# Patient Record
Sex: FEMALE | Race: WHITE | Employment: FULL TIME | URBAN - METROPOLITAN AREA
[De-identification: names, ages, dates, MRNs, and addresses within clinical notes are randomized per-mention and may not be internally consistent; named-entity substitution may affect disease eponyms.]

---

## 2017-02-10 ENCOUNTER — PATIENT MESSAGE (OUTPATIENT)
Dept: INTERNAL MEDICINE CLINIC | Age: 57
End: 2017-02-10

## 2017-02-10 DIAGNOSIS — E03.9 HYPOTHYROIDISM, ACQUIRED: Primary | ICD-10-CM

## 2017-03-01 ENCOUNTER — PATIENT MESSAGE (OUTPATIENT)
Dept: INTERNAL MEDICINE CLINIC | Age: 57
End: 2017-03-01

## 2017-03-01 DIAGNOSIS — E03.9 ACQUIRED HYPOTHYROIDISM: Primary | ICD-10-CM

## 2017-03-01 DIAGNOSIS — E03.9 HYPOTHYROIDISM, ACQUIRED: ICD-10-CM

## 2017-03-01 LAB — TSH SERPL DL<=0.05 MIU/L-ACNC: 4.91 UIU/ML (ref 0.27–4.2)

## 2017-03-16 ENCOUNTER — TELEPHONE (OUTPATIENT)
Dept: INTERNAL MEDICINE CLINIC | Age: 57
End: 2017-03-16

## 2017-03-20 PROBLEM — E03.9 ACQUIRED HYPOTHYROIDISM: Status: ACTIVE | Noted: 2017-03-20

## 2017-03-20 RX ORDER — LEVOTHYROXINE SODIUM 0.05 MG/1
50 TABLET ORAL DAILY
Qty: 30 TABLET | Refills: 3 | Status: SHIPPED | OUTPATIENT
Start: 2017-03-20 | End: 2017-07-25 | Stop reason: SDUPTHER

## 2017-06-06 LAB — TSH SERPL DL<=0.05 MIU/L-ACNC: 2.75 UIU/ML (ref 0.27–4.2)

## 2017-06-20 ENCOUNTER — OFFICE VISIT (OUTPATIENT)
Dept: INTERNAL MEDICINE CLINIC | Age: 57
End: 2017-06-20

## 2017-06-20 VITALS
HEIGHT: 66 IN | RESPIRATION RATE: 12 BRPM | WEIGHT: 202 LBS | BODY MASS INDEX: 32.47 KG/M2 | SYSTOLIC BLOOD PRESSURE: 110 MMHG | DIASTOLIC BLOOD PRESSURE: 82 MMHG

## 2017-06-20 DIAGNOSIS — R23.2 HOT FLASHES: ICD-10-CM

## 2017-06-20 DIAGNOSIS — E03.9 ACQUIRED HYPOTHYROIDISM: Primary | ICD-10-CM

## 2017-06-20 DIAGNOSIS — E66.9 OBESITY (BMI 30-39.9): ICD-10-CM

## 2017-06-20 PROCEDURE — 99213 OFFICE O/P EST LOW 20 MIN: CPT | Performed by: INTERNAL MEDICINE

## 2017-07-25 ENCOUNTER — TELEPHONE (OUTPATIENT)
Dept: INTERNAL MEDICINE CLINIC | Age: 57
End: 2017-07-25

## 2017-07-25 RX ORDER — LEVOTHYROXINE SODIUM 0.05 MG/1
50 TABLET ORAL DAILY
Qty: 90 TABLET | Refills: 3 | Status: SHIPPED | OUTPATIENT
Start: 2017-07-25 | End: 2017-11-02 | Stop reason: SDUPTHER

## 2017-11-01 ENCOUNTER — OFFICE VISIT (OUTPATIENT)
Dept: INTERNAL MEDICINE CLINIC | Age: 57
End: 2017-11-01

## 2017-11-01 VITALS
RESPIRATION RATE: 12 BRPM | WEIGHT: 202 LBS | HEIGHT: 66 IN | BODY MASS INDEX: 32.47 KG/M2 | HEART RATE: 62 BPM | DIASTOLIC BLOOD PRESSURE: 78 MMHG | SYSTOLIC BLOOD PRESSURE: 136 MMHG

## 2017-11-01 DIAGNOSIS — E03.9 ACQUIRED HYPOTHYROIDISM: ICD-10-CM

## 2017-11-01 DIAGNOSIS — Z00.00 ANNUAL PHYSICAL EXAM: Primary | ICD-10-CM

## 2017-11-01 DIAGNOSIS — Z87.42 HISTORY OF ABNORMAL CERVICAL PAP SMEAR: ICD-10-CM

## 2017-11-01 LAB
A/G RATIO: 1.8 (ref 1.1–2.2)
ALBUMIN SERPL-MCNC: 4.4 G/DL (ref 3.4–5)
ALP BLD-CCNC: 107 U/L (ref 40–129)
ALT SERPL-CCNC: 24 U/L (ref 10–40)
ANION GAP SERPL CALCULATED.3IONS-SCNC: 13 MMOL/L (ref 3–16)
AST SERPL-CCNC: 20 U/L (ref 15–37)
BASOPHILS ABSOLUTE: 0 K/UL (ref 0–0.2)
BASOPHILS RELATIVE PERCENT: 0.8 %
BILIRUB SERPL-MCNC: 0.4 MG/DL (ref 0–1)
BILIRUBIN, POC: NORMAL
BLOOD URINE, POC: NORMAL
BUN BLDV-MCNC: 13 MG/DL (ref 7–20)
CALCIUM SERPL-MCNC: 9.5 MG/DL (ref 8.3–10.6)
CHLORIDE BLD-SCNC: 101 MMOL/L (ref 99–110)
CHOLESTEROL, TOTAL: 236 MG/DL (ref 0–199)
CLARITY, POC: CLEAR
CO2: 28 MMOL/L (ref 21–32)
COLOR, POC: NORMAL
CREAT SERPL-MCNC: 0.6 MG/DL (ref 0.6–1.1)
EOSINOPHILS ABSOLUTE: 0.1 K/UL (ref 0–0.6)
EOSINOPHILS RELATIVE PERCENT: 1.5 %
GFR AFRICAN AMERICAN: >60
GFR NON-AFRICAN AMERICAN: >60
GLOBULIN: 2.5 G/DL
GLUCOSE BLD-MCNC: 86 MG/DL (ref 70–99)
GLUCOSE URINE, POC: NORMAL
HCT VFR BLD CALC: 44.5 % (ref 36–48)
HDLC SERPL-MCNC: 47 MG/DL (ref 40–60)
HEMOGLOBIN: 14.7 G/DL (ref 12–16)
KETONES, POC: NORMAL
LDL CHOLESTEROL CALCULATED: 157 MG/DL
LEUKOCYTE EST, POC: NORMAL
LYMPHOCYTES ABSOLUTE: 1.4 K/UL (ref 1–5.1)
LYMPHOCYTES RELATIVE PERCENT: 23.4 %
MCH RBC QN AUTO: 29.5 PG (ref 26–34)
MCHC RBC AUTO-ENTMCNC: 32.9 G/DL (ref 31–36)
MCV RBC AUTO: 89.6 FL (ref 80–100)
MONOCYTES ABSOLUTE: 0.4 K/UL (ref 0–1.3)
MONOCYTES RELATIVE PERCENT: 7.2 %
NEUTROPHILS ABSOLUTE: 4.1 K/UL (ref 1.7–7.7)
NEUTROPHILS RELATIVE PERCENT: 67.1 %
NITRITE, POC: NORMAL
PDW BLD-RTO: 13.4 % (ref 12.4–15.4)
PH, POC: 5
PLATELET # BLD: 250 K/UL (ref 135–450)
PMV BLD AUTO: 7.7 FL (ref 5–10.5)
POTASSIUM SERPL-SCNC: 4.9 MMOL/L (ref 3.5–5.1)
PROTEIN, POC: NORMAL
RBC # BLD: 4.97 M/UL (ref 4–5.2)
SODIUM BLD-SCNC: 142 MMOL/L (ref 136–145)
SPECIFIC GRAVITY, POC: 1.01
TOTAL PROTEIN: 6.9 G/DL (ref 6.4–8.2)
TRIGL SERPL-MCNC: 158 MG/DL (ref 0–150)
TSH SERPL DL<=0.05 MIU/L-ACNC: 2.47 UIU/ML (ref 0.27–4.2)
UROBILINOGEN, POC: 0.2
VITAMIN D 25-HYDROXY: 36.6 NG/ML
VLDLC SERPL CALC-MCNC: 32 MG/DL
WBC # BLD: 6.2 K/UL (ref 4–11)

## 2017-11-01 PROCEDURE — 81002 URINALYSIS NONAUTO W/O SCOPE: CPT | Performed by: INTERNAL MEDICINE

## 2017-11-01 PROCEDURE — 99396 PREV VISIT EST AGE 40-64: CPT | Performed by: INTERNAL MEDICINE

## 2017-11-01 PROCEDURE — 93000 ELECTROCARDIOGRAM COMPLETE: CPT | Performed by: INTERNAL MEDICINE

## 2017-11-01 ASSESSMENT — PATIENT HEALTH QUESTIONNAIRE - PHQ9
1. LITTLE INTEREST OR PLEASURE IN DOING THINGS: 0
SUM OF ALL RESPONSES TO PHQ QUESTIONS 1-9: 0
SUM OF ALL RESPONSES TO PHQ9 QUESTIONS 1 & 2: 0
2. FEELING DOWN, DEPRESSED OR HOPELESS: 0

## 2017-11-01 NOTE — PATIENT INSTRUCTIONS
Preventive plan of care for Mandi Becker        11/1/2017           Preventive Measures Status       Recommendations for screening   Colon Cancer Screen   Last colonoscopy: 9/13/16 Test recommended every 10 years. Next colonoscopy due in 2026   Breast Cancer Screen  Last mammogram: 2/16/2016  Test should be obtained yearly. Due   Cervical Cancer Screen   Last PAP smear: 12/8/2015, Dr. Derick Lyons. Test is needed yearly unless otherwise advised by your gynecologist.  Anupama Gabailey   Osteoporosis Screen   Last DXA scan: 6/6/2014, Normal Repeat test at age 73   Diabetes Screen  Glucose (mg/dL)   Date Value   09/01/2016 99    Test recommended and ordered   Cholesterol Screen  Lab Results   Component Value Date    CHOL 211 (H) 09/01/2016    Test recommended and ordered   HIV screening recommended for those ages 12-76 NO MATTER THE RISK FOR HIV--  9/1/2016 No need to repeat at this time   Hepatitis C screening recommended for those born between [facility]-- 9/1/2016 No need to repeat   Aspirin for Cardiovascular Prevention   none Baby aspirin (81 mg) recommended for women beginning at the age of 54. Weight: Body mass index is 32.6 kg/m². 5' 6\" (1.676 m)202 lb (91.6 kg)    Your BMI is above 24.9.  > or equal to 30 is the obesity category. Continue aggressive diet and exercise changes. Recommended Immunizations    Immunization History   Administered Date(s) Administered    Influenza Virus Vaccine 10/10/2017    Tdap (Boostrix, Adacel) 05/21/2014        Hep A-- Date of vaccination unknown. Influenza vaccine:  recommended every fall-- previously received    Pneumonia vaccine: Prevnar due at age 72    Tetanus vaccine:  tetanus and diptheria vaccine (Td/Tdap) recommended every 10 years- Td due in 2024. Shingles vaccine:  recommended as a one time dose after the age of 61         Additional Recommendations   1. Sunscreen use discussed and recommended  2.  Continue a healthy lifestyle including a healthy diet and aerobic exercise  3. Update your eye exam every 2 years  4. Always wear a seatbelt  5. Always wear a helmet when riding a bike or motorcycle  6. See your dermatologist yearly for comprehensive of skin exam-- Dr. Jermaine Cooper.  7. See Dr. Shantal Hutson        Here are a few  Reliable websites with a variety of health and wellness information:   www.mylifecheck. heart. org     www.nutritionsource. org     www. americanheart. org     www. diabetes. org      www.menopause. org     www.[facility]     www.[employer] ([facility] site)          Patient Education        Hypothyroidism: Care Instructions  Your Care Instructions  You have hypothyroidism, which means that your body is not making enough thyroid hormone. This hormone helps your body use energy. If your thyroid level is low, you may feel tired, be constipated, have an increase in your blood pressure, or have dry skin or memory problems. You may also get cold easily, even when it is warm. Women with low thyroid levels may have heavy menstrual periods. A blood test to find your thyroid-stimulating hormone (TSH) level is used to check for hypothyroidism. A high TSH level may mean that you have low thyroid. When your body is not making enough thyroid hormone, TSH levels rise in an effort to make the body produce more. The treatment for hypothyroidism is to take thyroid hormone pills. You should start to feel better in 1 to 2 weeks. But it can take several months to see changes in the TSH level. You will need regular visits with your doctor to make sure you have the right dose of medicine. Most people need treatment for the rest of their lives. You will need to see your doctor regularly to have blood tests and to make sure you are doing well. Follow-up care is a key part of your treatment and safety. Be sure to make and go to all appointments, and call your doctor if you are having problems.  Its also a good idea to know your test results and keep a list of the medicines you take. How can you care for yourself at home? · Take your thyroid hormone medicine exactly as prescribed. Call your doctor if you think you are having a problem with your medicine. Most people do not have side effects if they take the right amount of medicine regularly. ¨ Take the medicine 30 minutes before breakfast, and do not take it with calcium, vitamins, or iron. ¨ Do not take extra doses of your thyroid medicine. It will not help you get better any faster, and it may cause side effects. ¨ If you forget to take a dose, do NOT take a double dose of medicine. Take your usual dose the next day. · Tell your doctor about all prescription, herbal, or over-the-counter products you take. · Take care of yourself. Eat a healthy diet, get enough sleep, and get regular exercise. When should you call for help? Call 911 anytime you think you may need emergency care. For example, call if:  · You passed out (lost consciousness). · You have severe trouble breathing. · You have a very slow heartbeat (less than 60 beats a minute). · You have a low body temperature (95°F or below). Call your doctor now or seek immediate medical care if:  · You feel tired, sluggish, or weak. · You have trouble remembering things or concentrating. · You do not begin to feel better 2 weeks after starting your medicine. Watch closely for changes in your health, and be sure to contact your doctor if you have any problems. Where can you learn more? Go to https://[employer]. org and sign in to your [employer] account. Enter T476 in the [facility] box to learn more about \"Hypothyroidism: Care Instructions. \"     If you do not have an account, please click on the \"Sign Up Now\" link. Current as of: July 28, 2016  Content Version: 11.3  © 8773-3572 [employer]. Care instructions adapted under license by [facility] ([facility]).  If you have questions about a medical condition or this instruction, always ask your healthcare professional. [place] [address] any warranty or liability for your use of this information. Patient Education        Well Visit, Women 48 to 72: Care Instructions  Your Care Instructions  Physical exams can help you stay healthy. Your doctor has checked your overall health and may have suggested ways to take good care of yourself. He or she also may have recommended tests. At home, you can help prevent illness with healthy eating, regular exercise, and other steps. Follow-up care is a key part of your treatment and safety. Be sure to make and go to all appointments, and call your doctor if you are having problems. It's also a good idea to know your test results and keep a list of the medicines you take. How can you care for yourself at home? · Reach and stay at a healthy weight. This will lower your risk for many problems, such as obesity, diabetes, heart disease, and high blood pressure. · Get at least 30 minutes of exercise on most days of the week. Walking is a good choice. You also may want to do other activities, such as running, swimming, cycling, or playing tennis or team sports. · Do not smoke. Smoking can make health problems worse. If you need help quitting, talk to your doctor about stop-smoking programs and medicines. These can increase your chances of quitting for good. · Protect your skin from too much sun. When you're outdoors from 10 a.m. to 4 p.m., stay in the shade or cover up with clothing and a hat with a wide brim. Wear sunglasses that block UV rays. Even when it's cloudy, put broad-spectrum sunscreen (SPF 30 or higher) on any exposed skin. · See a dentist one or two times a year for checkups and to have your teeth cleaned. · Wear a seat belt in the car. · Limit alcohol to 1 drink a day. Too much alcohol can cause health problems. Follow your doctor's advice about when to have certain tests.  These tests can spot problems early.  · Cholesterol. Your doctor will tell you how often to have this done based on your age, family history, or other things that can increase your risk for heart attack and stroke. · Blood pressure. Have your blood pressure checked during a routine doctor visit. Your doctor will tell you how often to check your blood pressure based on your age, your blood pressure results, and other factors. · Mammogram. Ask your doctor how often you should have a mammogram, which is an X-ray of your breasts. A mammogram can spot breast cancer before it can be felt and when it is easiest to treat. · Pap test and pelvic exam. Ask your doctor how often you should have a Pap test. You may not need to have a Pap test as often as you used to. · Vision. Have your eyes checked every year or two or as often as your doctor suggests. Some experts recommend that you have yearly exams for glaucoma and other age-related eye problems starting at age 48. · Hearing. Tell your doctor if you notice any change in your hearing. You can have tests to find out how well you hear. · Diabetes. Ask your doctor whether you should have tests for diabetes. · Colon cancer. You should begin tests for colon cancer at age 48. You may have one of several tests. Your doctor will tell you how often to have tests based on your age and risk. Risks include whether you already had a precancerous polyp removed from your colon or whether your parents, sisters and brothers, or children have had colon cancer. · Thyroid disease. Talk to your doctor about whether to have your thyroid checked as part of a regular physical exam. Women have an increased chance of a thyroid problem. · Osteoporosis. You should begin tests for bone density at age 72. If you are younger than 72, ask your doctor whether you have factors that may increase your risk for this disease. You may want to have this test before age 72. · Heart attack and stroke risk.  At least every 4 to 6 years,

## 2017-11-01 NOTE — PROGRESS NOTES
[facility]) Physicians  Internal Medicine  Patient Encounter  Lea Garcia D.O., [facility] Preventative Physical    Chief Complaint   Patient presents with    Annual Exam       HPI-- 65 y.o. female presents today for a complete annual physical.      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          Allergies   Allergen Reactions    Prednisone      Severe steroid psychosis    Lorazepam      Paradoxical reaction         Substance Use History     Social History   Substance Use Topics    Smoking status: Former Smoker     Packs/day: 1.00     Years: 10.00     Quit date: 8/31/1996    Smokeless tobacco: Never Used    Alcohol use No          Family History     Family History   Problem Relation Age of Onset    Cancer Mother 59     Leukemia    Cancer Father 48     Melenoma    Rheum Arthritis Neg Hx     Osteoarthritis Neg Hx     Breast Cancer Neg Hx     Heart Failure Neg Hx     Hypertension Neg Hx     Migraines Neg Hx     Ovarian Cancer Neg Hx     Rashes/Skin Problems Neg Hx     Seizures Neg Hx     Thyroid Disease Neg Hx               REVIEW OF SYSTEMS:    CONSTITUTIONAL:  Neg   Recent weight changes, fatigue, fever, chills or night sweats, anorexia, Sleep difficulties  EYES: Neg  Blurry

## 2017-11-03 RX ORDER — LEVOTHYROXINE SODIUM 0.05 MG/1
50 TABLET ORAL DAILY
Qty: 90 TABLET | Refills: 3 | Status: SHIPPED | OUTPATIENT
Start: 2017-11-03 | End: 2018-12-26 | Stop reason: SDUPTHER

## 2017-11-14 ENCOUNTER — OFFICE VISIT (OUTPATIENT)
Dept: GYNECOLOGY | Age: 57
End: 2017-11-14

## 2017-11-14 VITALS
SYSTOLIC BLOOD PRESSURE: 137 MMHG | TEMPERATURE: 96.7 F | HEART RATE: 73 BPM | DIASTOLIC BLOOD PRESSURE: 85 MMHG | WEIGHT: 204 LBS | BODY MASS INDEX: 33.99 KG/M2 | RESPIRATION RATE: 17 BRPM | HEIGHT: 65 IN

## 2017-11-14 DIAGNOSIS — Z78.0 MENOPAUSE: ICD-10-CM

## 2017-11-14 DIAGNOSIS — Z01.419 WELL WOMAN EXAM WITH ROUTINE GYNECOLOGICAL EXAM: Primary | ICD-10-CM

## 2017-11-14 PROCEDURE — 99396 PREV VISIT EST AGE 40-64: CPT | Performed by: OBSTETRICS & GYNECOLOGY

## 2017-11-16 LAB
HPV COMMENT: NORMAL
HPV TYPE 16: NOT DETECTED
HPV TYPE 18: NOT DETECTED
HPVOH (OTHER TYPES): NOT DETECTED

## 2017-11-19 ASSESSMENT — ENCOUNTER SYMPTOMS
RESPIRATORY NEGATIVE: 1
EYES NEGATIVE: 1
BACK PAIN: 1
GASTROINTESTINAL NEGATIVE: 1

## 2017-11-19 NOTE — PROGRESS NOTES
Outpatient Prescriptions Marked as Taking for the 11/14/17 encounter (Office Visit) with Millie Brar MD   Medication Sig Dispense Refill    levothyroxine (LEVOTHROID) 50 MCG tablet Take 1 tablet by mouth daily 90 tablet 3     Family History   Problem Relation Age of Onset    Cancer Mother 59     Leukemia    Cancer Father 48     Melenoma    Rheum Arthritis Neg Hx     Osteoarthritis Neg Hx     Breast Cancer Neg Hx     Heart Failure Neg Hx     Hypertension Neg Hx     Migraines Neg Hx     Ovarian Cancer Neg Hx     Rashes/Skin Problems Neg Hx     Seizures Neg Hx     Thyroid Disease Neg Hx      /85 (Site: Left Arm, Position: Sitting, Cuff Size: Large Adult)   Pulse 73   Temp 96.7 °F (35.9 °C) (Oral)   Resp 17   Ht 5' 5\" (1.651 m)   Wt 204 lb (92.5 kg)   LMP 08/01/2016   Breastfeeding? No   BMI 33.95 kg/m²       Objective:   Physical Exam   Constitutional: She is oriented to person, place, and time. She appears well-developed and well-nourished. No distress. HENT:   Head: Normocephalic and atraumatic. Eyes: EOM are normal. Pupils are equal, round, and reactive to light. Neck: Normal range of motion. Neck supple. No thyromegaly present. Cardiovascular: Normal rate, regular rhythm and normal heart sounds. Exam reveals no gallop and no friction rub. No murmur heard. Pulmonary/Chest: Effort normal and breath sounds normal. No respiratory distress. She has no wheezes. She has no rales. Abdominal: Soft. Bowel sounds are normal. She exhibits no distension and no mass. There is no hepatomegaly. There is no tenderness. There is no rebound and no guarding. No hernia. Genitourinary: Rectum normal, vagina normal and uterus normal. Rectal exam shows no external hemorrhoid, no internal hemorrhoid, no fissure, no mass, no tenderness and guaiac negative stool. No breast swelling, tenderness, discharge or bleeding. There is no rash, tenderness, lesion or injury on the right labia.  There

## 2018-02-23 ENCOUNTER — OFFICE VISIT (OUTPATIENT)
Dept: INTERNAL MEDICINE CLINIC | Age: 58
End: 2018-02-23

## 2018-02-23 ENCOUNTER — HOSPITAL ENCOUNTER (OUTPATIENT)
Dept: OTHER | Age: 58
Discharge: HOME | End: 2018-02-23
Attending: INTERNAL MEDICINE | Admitting: INTERNAL MEDICINE

## 2018-02-23 VITALS — WEIGHT: 210 LBS | SYSTOLIC BLOOD PRESSURE: 130 MMHG | BODY MASS INDEX: 34.95 KG/M2 | DIASTOLIC BLOOD PRESSURE: 80 MMHG

## 2018-02-23 DIAGNOSIS — G89.29 CHRONIC BILATERAL LOW BACK PAIN WITH RIGHT-SIDED SCIATICA: Primary | ICD-10-CM

## 2018-02-23 DIAGNOSIS — M54.41 CHRONIC BILATERAL LOW BACK PAIN WITH RIGHT-SIDED SCIATICA: Primary | ICD-10-CM

## 2018-02-23 DIAGNOSIS — G89.29 CHRONIC BILATERAL LOW BACK PAIN WITH RIGHT-SIDED SCIATICA: ICD-10-CM

## 2018-02-23 DIAGNOSIS — M54.41 CHRONIC BILATERAL LOW BACK PAIN WITH RIGHT-SIDED SCIATICA: ICD-10-CM

## 2018-02-23 DIAGNOSIS — M47.26 OSTEOARTHRITIS OF SPINE WITH RADICULOPATHY, LUMBAR REGION: ICD-10-CM

## 2018-02-23 DIAGNOSIS — H93.8X1 EAR FULLNESS, RIGHT: ICD-10-CM

## 2018-02-23 PROCEDURE — 1036F TOBACCO NON-USER: CPT | Performed by: INTERNAL MEDICINE

## 2018-02-23 PROCEDURE — G8482 FLU IMMUNIZE ORDER/ADMIN: HCPCS | Performed by: INTERNAL MEDICINE

## 2018-02-23 PROCEDURE — G8428 CUR MEDS NOT DOCUMENT: HCPCS | Performed by: INTERNAL MEDICINE

## 2018-02-23 PROCEDURE — 3017F COLORECTAL CA SCREEN DOC REV: CPT | Performed by: INTERNAL MEDICINE

## 2018-02-23 PROCEDURE — 99213 OFFICE O/P EST LOW 20 MIN: CPT | Performed by: INTERNAL MEDICINE

## 2018-02-23 PROCEDURE — G8417 CALC BMI ABV UP PARAM F/U: HCPCS | Performed by: INTERNAL MEDICINE

## 2018-02-23 PROCEDURE — 3014F SCREEN MAMMO DOC REV: CPT | Performed by: INTERNAL MEDICINE

## 2018-02-23 RX ORDER — IBUPROFEN 800 MG/1
800 TABLET ORAL
Qty: 30 TABLET | Refills: 1 | Status: SHIPPED | OUTPATIENT
Start: 2018-02-23 | End: 2018-03-23 | Stop reason: DRUGHIGH

## 2018-02-23 RX ORDER — TIZANIDINE 4 MG/1
4 TABLET ORAL NIGHTLY
Qty: 30 TABLET | Refills: 0 | Status: SHIPPED | OUTPATIENT
Start: 2018-02-23 | End: 2018-11-30 | Stop reason: ALTCHOICE

## 2018-02-23 NOTE — PATIENT INSTRUCTIONS
which helps tilt your hips forward. This takes pressure off your lower back. · Try sitting on an exercise ball. It can rock from side to side, which helps keep your back loose. · When driving, keep your knees nearly level with your hips. Sit straight, and drive with both hands on the steering wheel. Your arms should be in a slightly bent position. Reduce stress on your back through careful lifting  · Squat down, bending at the hips and knees only. If you need to, put one knee to the floor and extend your other knee in front of you, bent at a right angle (half kneeling). · Press your chest straight forward. This helps keep your upper back straight while keeping a slight arch in your low back. · Hold the load as close to your body as possible, at the level of your belly button (navel). · Use your feet to change direction, taking small steps. · Lead with your hips as you change direction. Keep your shoulders in line with your hips as you move. · Set down your load carefully, squatting with your knees and hips only. Exercise and stretch your back  · Do some exercise on most days of the week, if your doctor says it is okay. You can walk, run, swim, or cycle. · Stretch your back muscles. Here are a few exercises to try:  Jose Luis Pata on your back, and gently pull one bent knee to your chest. Put that foot back on the floor, and then pull the other knee to your chest.  ¨ Do pelvic tilts. Lie on your back with your knees bent. Tighten your stomach muscles. Pull your belly button (navel) in and up toward your ribs. You should feel like your back is pressing to the floor and your hips and pelvis are slightly lifting off the floor. Hold for 6 seconds while breathing smoothly. ¨ Sit with your back flat against a wall. · Keep your core muscles strong. The muscles of your back, belly (abdomen), and buttocks support your spine. ¨ Pull in your belly and imagine pulling your navel toward your spine.  Hold this for 6 seconds, floor. Gently pull one bent knee to your chest. Put that foot back on the floor, and then pull the other knee to your chest. Hold for 15 to 30 seconds. Repeat 2 to 4 times. ¨ Do pelvic tilts. Lie on your back with your knees bent. Tighten your stomach muscles. Pull your belly button (navel) in and up toward your ribs. You should feel like your back is pressing to the floor and your hips and pelvis are slightly lifting off the floor. Hold for 6 seconds while breathing smoothly. · Keep your core muscles strong. The muscles of your back, belly (abdomen), and buttocks support your spine. ¨ Pull in your belly, and imagine pulling your navel toward your spine. Hold this for 6 seconds, then relax. Remember to keep breathing normally as you tense your muscles. ¨ Do curl-ups. Always do them with your knees bent. Keep your low back on the floor, and curl your shoulders toward your knees using a smooth, slow motion. Keep your arms folded across your chest. If this bothers your neck, try putting your hands behind your neck (not your head), with your elbows spread apart. ¨ Lie on your back with your knees bent and your feet flat on the floor. Tighten your belly muscles, and then push with your feet and raise your buttocks up a few inches. Hold this position 6 seconds as you continue to breathe normally, then lower yourself slowly to the floor. Repeat 8 to 12 times. ¨ If you like group exercise, try Pilates or yoga. These classes have poses that strengthen the core muscles. Protect your back when you sit  · Place a small pillow, a rolled-up towel, or a lumbar roll in the curve of your back if you need extra support. · Sit in a chair that is low enough to let you place both feet flat on the floor with both knees nearly level with your hips. If your chair or desk is too high, use a foot rest to raise your knees. · When driving, keep your knees nearly level with your hips.  Sit straight, and drive with both hands on the link.  Current as of: March 21, 2017  Content Version: 11.5  © 8892-7841 [employer]. Care instructions adapted under license by [facility] ([facility]). If you have questions about a medical condition or this instruction, always ask your healthcare professional. [address] any warranty or liability for your use of this information. Patient Education        Low Back Pain: Exercises  Your Care Instructions  Here are some examples of typical rehabilitation exercises for your condition. Start each exercise slowly. Ease off the exercise if you start to have pain. Your doctor or physical therapist will tell you when you can start these exercises and which ones will work best for you. How to do the exercises  Press-up    5. Lie on your stomach, supporting your body with your forearms. 6. Press your elbows down into the floor to raise your upper back. As you do this, relax your stomach muscles and allow your back to arch without using your back muscles. As your press up, do not let your hips or pelvis come off the floor. 7. Hold for 15 to 30 seconds, then relax. 8. Repeat 2 to 4 times. Alternate arm and leg (bird dog) exercise    Do this exercise slowly. Try to keep your body straight at all times, and do not let one hip drop lower than the other. 5. Start on the floor, on your hands and knees. 6. Tighten your belly muscles. 7. Raise one leg off the floor, and hold it straight out behind you. Be careful not to let your hip drop down, because that will twist your trunk. 8. Hold for about 6 seconds, then lower your leg and switch to the other leg. 9. Repeat 8 to 12 times on each leg. 10. Over time, work up to holding for 10 to 30 seconds each time. 11. If you feel stable and secure with your leg raised, try raising the opposite arm straight out in front of you at the same time. Knee-to-chest exercise    5.  Lie on your back with your knees bent and your feet flat on the

## 2018-03-08 ENCOUNTER — HOSPITAL ENCOUNTER (OUTPATIENT)
Dept: PHYSICAL THERAPY | Age: 58
Discharge: HOME | End: 2018-03-31
Attending: INTERNAL MEDICINE | Admitting: INTERNAL MEDICINE

## 2018-03-08 ASSESSMENT — PAIN SCALES - GENERAL: PAINLEVEL_OUTOF10: 8

## 2018-03-08 ASSESSMENT — PAIN DESCRIPTION - PAIN TYPE: TYPE: CHRONIC PAIN

## 2018-03-08 ASSESSMENT — PAIN DESCRIPTION - ORIENTATION: ORIENTATION: RIGHT

## 2018-03-08 ASSESSMENT — PAIN DESCRIPTION - LOCATION: LOCATION: BACK

## 2018-03-22 ENCOUNTER — TELEPHONE (OUTPATIENT)
Dept: GYNECOLOGY | Age: 58
End: 2018-03-22

## 2018-03-22 DIAGNOSIS — N95.0 POSTMENOPAUSAL BLEEDING: Primary | ICD-10-CM

## 2018-03-22 NOTE — TELEPHONE ENCOUNTER
Vaginal Bleeding:    How long has patient had symptoms:Started 3/21/18    Is the patient experiencing clotting? No    If yes please describe the size of the clots: How many pads per hour is the patient using at this point:0    Has the # of pads increased over the last few hours No:     Is the patient experiencing cramping? Yes      If yes to cramping please describe where pain is occuring:    Pain Rates as 2 / 10 on pain scale. Are you allergic to any medications? Yes    Pt states she has not had a cycle for 2 years. Yesterday she started with light bleeding when she wipes there is bright red blood. She denies any urinary frequency or urgency no burning or discoloration to urine. No fever No Urinary frequency,urgency or dysuria. No pelvic pain. No fever. Mild abd cramping. She was scheduled with Dr Dion Alvarado for follow up 4/12/18. Pt was given bleeding precautions. Pt is aware if bleeding heavy to the point of soaking a pad or tampon every hour or having large clots that she will need to report to the Emergency Department  . Please advise.

## 2018-03-22 NOTE — TELEPHONE ENCOUNTER
Called patient advised, she will call and get this done, already has appt set up with Dr. Boyd Stephen for 4/12 DONE

## 2018-03-23 ENCOUNTER — OFFICE VISIT (OUTPATIENT)
Dept: INTERNAL MEDICINE CLINIC | Age: 58
End: 2018-03-23

## 2018-03-23 ENCOUNTER — TELEPHONE (OUTPATIENT)
Dept: INTERNAL MEDICINE CLINIC | Age: 58
End: 2018-03-23

## 2018-03-23 VITALS — BODY MASS INDEX: 34.78 KG/M2 | DIASTOLIC BLOOD PRESSURE: 60 MMHG | WEIGHT: 209 LBS | SYSTOLIC BLOOD PRESSURE: 110 MMHG

## 2018-03-23 DIAGNOSIS — G89.29 CHRONIC BILATERAL LOW BACK PAIN WITH RIGHT-SIDED SCIATICA: ICD-10-CM

## 2018-03-23 DIAGNOSIS — M54.41 CHRONIC BILATERAL LOW BACK PAIN WITH RIGHT-SIDED SCIATICA: ICD-10-CM

## 2018-03-23 DIAGNOSIS — M99.03 LUMBAR REGION SOMATIC DYSFUNCTION: ICD-10-CM

## 2018-03-23 DIAGNOSIS — M51.36 DDD (DEGENERATIVE DISC DISEASE), LUMBAR: Primary | ICD-10-CM

## 2018-03-23 PROCEDURE — 3014F SCREEN MAMMO DOC REV: CPT | Performed by: INTERNAL MEDICINE

## 2018-03-23 PROCEDURE — 3017F COLORECTAL CA SCREEN DOC REV: CPT | Performed by: INTERNAL MEDICINE

## 2018-03-23 PROCEDURE — 99213 OFFICE O/P EST LOW 20 MIN: CPT | Performed by: INTERNAL MEDICINE

## 2018-03-23 PROCEDURE — G8482 FLU IMMUNIZE ORDER/ADMIN: HCPCS | Performed by: INTERNAL MEDICINE

## 2018-03-23 PROCEDURE — G8427 DOCREV CUR MEDS BY ELIG CLIN: HCPCS | Performed by: INTERNAL MEDICINE

## 2018-03-23 PROCEDURE — 1036F TOBACCO NON-USER: CPT | Performed by: INTERNAL MEDICINE

## 2018-03-23 PROCEDURE — G8417 CALC BMI ABV UP PARAM F/U: HCPCS | Performed by: INTERNAL MEDICINE

## 2018-03-23 RX ORDER — IBUPROFEN 800 MG/1
800 TABLET ORAL NIGHTLY PRN
Qty: 30 TABLET | Refills: 1 | Status: SHIPPED | OUTPATIENT
Start: 2018-03-23 | End: 2020-03-03 | Stop reason: ALTCHOICE

## 2018-03-23 NOTE — PROGRESS NOTES
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 Authorizing Provider   ibuprofen (ADVIL;MOTRIN) 800 MG tablet Take 1 tablet by mouth nightly as needed Yes Jonatan Garcia, DO   Tens Unit MISC by Does not apply route Apply daily as needed Yes Jonatan Garcia, DO   tiZANidine (ZANAFLEX) 4 MG tablet Take 1 tablet by mouth nightly Yes Jonatan Garcia, DO   levothyroxine (LEVOTHROID) 50 MCG tablet Take 1 tablet by mouth daily Yes [facility], DO           Review of Systems - As per HPI      OBJECTIVE:  /60   Wt 209 lb (94.8 kg)   LMP 08/01/2016   BMI 34.78 kg/m²   GEN: NAD, A&O, Non-toxic  MS:  Tenderness with palpation of the lumbar paraspinals. Increased soft tissue tone and spasm noted. NEURO: No focal or lateralizing deficits. VASC:  No carotid bruits. Pulses symmetric    ASSESSMENT[de-identified]  Benson Carina was seen today for follow-up and back pain. Diagnoses and all orders for this visit:    DDD (degenerative disc disease), lumbar  -     Tens Unit MISC; by Does not apply route Apply daily as needed    Chronic bilateral low back pain with right-sided sciatica  -     ibuprofen (ADVIL;MOTRIN) 800 MG tablet; Take 1 tablet by mouth nightly as needed  -     Tens Unit MISC; by Does not apply route Apply daily as needed    Lumbar region somatic dysfunction  -     Tens Unit MISC; by Does not apply route Apply daily as needed        Additional Plan:  1. HEP  2. May need MRI I symptoms worse. Discussed medications with patient who voiced understanding of their use, indication and potential side effects. Pt also understands the above recommendations. All questions answered.

## 2018-03-27 ENCOUNTER — HOSPITAL ENCOUNTER (OUTPATIENT)
Dept: ULTRASOUND IMAGING | Age: 58
Discharge: HOME | End: 2018-03-27
Attending: NURSE PRACTITIONER | Admitting: NURSE PRACTITIONER

## 2018-03-27 DIAGNOSIS — N95.0 POSTMENOPAUSAL BLEEDING: ICD-10-CM

## 2018-04-01 ENCOUNTER — HOSPITAL ENCOUNTER (OUTPATIENT)
Dept: OTHER | Age: 58
Discharge: HOME | End: 2018-04-30
Attending: INTERNAL MEDICINE | Admitting: INTERNAL MEDICINE

## 2018-04-12 ENCOUNTER — OFFICE VISIT (OUTPATIENT)
Dept: GYNECOLOGY | Age: 58
End: 2018-04-12

## 2018-04-12 VITALS
HEIGHT: 65 IN | DIASTOLIC BLOOD PRESSURE: 78 MMHG | HEART RATE: 62 BPM | WEIGHT: 210.5 LBS | RESPIRATION RATE: 17 BRPM | BODY MASS INDEX: 35.07 KG/M2 | TEMPERATURE: 98 F | SYSTOLIC BLOOD PRESSURE: 140 MMHG

## 2018-04-12 DIAGNOSIS — N95.0 POST-MENOPAUSAL BLEEDING: Primary | ICD-10-CM

## 2018-04-12 PROCEDURE — 58100 BIOPSY OF UTERUS LINING: CPT | Performed by: OBSTETRICS & GYNECOLOGY

## 2018-04-12 RX ORDER — MULTIVITAMIN
1 TABLET ORAL
COMMUNITY
End: 2018-11-30

## 2018-04-18 DIAGNOSIS — Z09 FOLLOW-UP EXAM, 3-6 MONTHS SINCE PREVIOUS EXAM: Primary | ICD-10-CM

## 2018-04-18 DIAGNOSIS — R93.89 THICKENED ENDOMETRIUM: ICD-10-CM

## 2018-05-01 ENCOUNTER — HOSPITAL ENCOUNTER (OUTPATIENT)
Dept: OTHER | Age: 58
Discharge: HOME | End: 2018-05-31
Attending: INTERNAL MEDICINE | Admitting: INTERNAL MEDICINE

## 2018-10-22 ENCOUNTER — TELEPHONE (OUTPATIENT)
Dept: INTERNAL MEDICINE CLINIC | Age: 58
End: 2018-10-22

## 2018-10-22 NOTE — TELEPHONE ENCOUNTER
Pt stated that she was seen today at Urgent Care . She was seen for blood in her urine. Pt was told that she has bright red blood in urine . They told her to ask her doctor to order a CT to rule out Kidney Stones. Pt is leaving to go out of town and would like to get it done as soon as possible. Pt would like to have it done at [facility]. Please advise.

## 2018-10-23 ENCOUNTER — OFFICE VISIT (OUTPATIENT)
Dept: INTERNAL MEDICINE CLINIC | Age: 58
End: 2018-10-23
Payer: COMMERCIAL

## 2018-10-23 VITALS — TEMPERATURE: 98.2 F | DIASTOLIC BLOOD PRESSURE: 70 MMHG | SYSTOLIC BLOOD PRESSURE: 120 MMHG

## 2018-10-23 DIAGNOSIS — N95.0 POSTMENOPAUSAL VAGINAL BLEEDING: ICD-10-CM

## 2018-10-23 DIAGNOSIS — N95.0 PMB (POSTMENOPAUSAL BLEEDING): Primary | ICD-10-CM

## 2018-10-23 DIAGNOSIS — R30.0 DYSURIA: Primary | ICD-10-CM

## 2018-10-23 DIAGNOSIS — N30.01 ACUTE CYSTITIS WITH HEMATURIA: ICD-10-CM

## 2018-10-23 LAB
BILIRUBIN, POC: NORMAL
BLOOD URINE, POC: NORMAL
CLARITY, POC: NORMAL
COLOR, POC: YELLOW
GLUCOSE URINE, POC: NORMAL
KETONES, POC: NORMAL
LEUKOCYTE EST, POC: NORMAL
NITRITE, POC: NORMAL
PH, POC: 6
PROTEIN, POC: NORMAL
SPECIFIC GRAVITY, POC: 1.02
UROBILINOGEN, POC: 0.2

## 2018-10-23 PROCEDURE — G8484 FLU IMMUNIZE NO ADMIN: HCPCS | Performed by: INTERNAL MEDICINE

## 2018-10-23 PROCEDURE — G8417 CALC BMI ABV UP PARAM F/U: HCPCS | Performed by: INTERNAL MEDICINE

## 2018-10-23 PROCEDURE — 81002 URINALYSIS NONAUTO W/O SCOPE: CPT | Performed by: INTERNAL MEDICINE

## 2018-10-23 PROCEDURE — 3017F COLORECTAL CA SCREEN DOC REV: CPT | Performed by: INTERNAL MEDICINE

## 2018-10-23 PROCEDURE — 1036F TOBACCO NON-USER: CPT | Performed by: INTERNAL MEDICINE

## 2018-10-23 PROCEDURE — 99213 OFFICE O/P EST LOW 20 MIN: CPT | Performed by: INTERNAL MEDICINE

## 2018-10-23 PROCEDURE — G8428 CUR MEDS NOT DOCUMENT: HCPCS | Performed by: INTERNAL MEDICINE

## 2018-10-23 RX ORDER — SULFAMETHOXAZOLE AND TRIMETHOPRIM 800; 160 MG/1; MG/1
1 TABLET ORAL 2 TIMES DAILY
Qty: 10 TABLET | Refills: 0 | Status: SHIPPED | OUTPATIENT
Start: 2018-10-23 | End: 2018-10-28

## 2018-10-23 NOTE — PROGRESS NOTES
REZA Garcia DO   Multiple Vitamin (MULTIVITAMIN) tablet Take 1 tablet by mouth  Historical Provider, MD   ibuprofen (ADVIL;MOTRIN) 800 MG tablet Take 1 tablet by mouth nightly as needed  Toya Del Rio DO   Tens Unit MISC by Does not apply route Apply daily as needed  Jonatan Garcia DO   tiZANidine (ZANAFLEX) 4 MG tablet Take 1 tablet by mouth nightly  Jonatan Garcia DO   levothyroxine (LEVOTHROID) 50 MCG tablet Take 1 tablet by mouth daily  Jonatan Garcia DO           Review of Systems - As per HPI  GEN: Pt denies fever, chills or night sweats. Denies weight changes. Appetite good  HEENT: Pt denies visual and auditory changes, epistaxis, upper respiratory symptoms and dysphagia  CV: Pt denies CP, SOB, GEIGER, orthopnea palpitations, LE swelling, and claudication. PULM: Pt denies cough, wheezing or sputum production  GI: Pt denies N/V/D, heart burn, rectal bleeding, or melena. NEURO: Pt denies unilateral weakness, paresthesia and dizziness. OBJECTIVE:  /70   Temp 98.2 °F (36.8 °C)   LMP 08/01/2016   GEN: NAD, A&O, Non-toxic  HEENT: NC/AT, ARMOS, EOMI, Oral cavity Clear,  TM's NL, Nasal cavity clear. NECK: Supple. No thyromegaly. LYMPH: No C/SC nodes. CV: S1 S2 NL, RRR. No murmurs, clicks or rubs. PULM: CTA, symmentric air exchange  EXT: No C/C/E  GI: + Suprapubic tenderness and left lower quadrant tenderness to palpation. MS:  No CVA tenderness to percussion  : Perineal exam reveals no obvious vaginal bleeding. Speculum exam was not performed at this time. There is no blood emanating from the urethra.       Results for POC orders placed in visit on 10/23/18   POCT Urinalysis no Micro   Result Value Ref Range    Color, UA yellow     Clarity, UA hazy     Glucose, UA POC neg     Bilirubin, UA neg     Ketones, UA neg     Spec Grav, UA 1.020     Blood, UA POC large     pH, UA 6.0     Protein, UA POC +++     Urobilinogen, UA 0.2     Leukocytes, UA mod     Nitrite, UA neg         ASSESSMENT[de-identified]  Earl Marcusks

## 2018-10-23 NOTE — PATIENT INSTRUCTIONS
help wash out the bacteria that are causing the infection. (If you have kidney, heart, or liver disease and have to limit fluids, talk with your doctor before you increase your fluid intake.)  · Avoid drinks that are carbonated or have caffeine. They can irritate the bladder. · Urinate often. Try to empty your bladder each time. · To relieve pain, take a hot bath or lay a heating pad set on low over your lower belly or genital area. Never go to sleep with a heating pad in place. To prevent UTIs  · Drink plenty of water each day. This helps you urinate often, which clears bacteria from your system. (If you have kidney, heart, or liver disease and have to limit fluids, talk with your doctor before you increase your fluid intake.)  · Urinate when you need to. · Urinate right after you have sex. · Change sanitary pads often. · Avoid douches, bubble baths, feminine hygiene sprays, and other feminine hygiene products that have deodorants. · After going to the bathroom, wipe from front to back. When should you call for help? Call your doctor now or seek immediate medical care if:    · Symptoms such as fever, chills, nausea, or vomiting get worse or appear for the first time.     · You have new pain in your back just below your rib cage. This is called flank pain.     · There is new blood or pus in your urine.     · You have any problems with your antibiotic medicine.    Watch closely for changes in your health, and be sure to contact your doctor if:    · You are not getting better after taking an antibiotic for 2 days.     · Your symptoms go away but then come back. Where can you learn more? Go to https://[employer].com.Myze. org and sign in to your [employer] account. Enter D918 in the Minimally invasive devices box to learn more about \"Urinary Tract Infection in Women: Care Instructions. \"     If you do not have an account, please click on the \"Sign Up Now\" link. Current as of:  May 12, 2017  Content Version: 11.7  © 7115-2140 [employer]. Care instructions adapted under license by [facility] ([facility]). If you have questions about a medical condition or this instruction, always ask your healthcare professional. [address] any warranty or liability for your use of this information.

## 2018-10-24 ENCOUNTER — HOSPITAL ENCOUNTER (OUTPATIENT)
Dept: ULTRASOUND IMAGING | Age: 58
Discharge: HOME | End: 2018-10-24
Payer: COMMERCIAL

## 2018-10-24 DIAGNOSIS — N95.0 PMB (POSTMENOPAUSAL BLEEDING): ICD-10-CM

## 2018-10-24 PROCEDURE — 76856 US EXAM PELVIC COMPLETE: CPT

## 2018-10-24 PROCEDURE — 76830 TRANSVAGINAL US NON-OB: CPT

## 2018-10-26 ENCOUNTER — OFFICE VISIT (OUTPATIENT)
Dept: GYNECOLOGY | Age: 58
End: 2018-10-26
Payer: COMMERCIAL

## 2018-10-26 VITALS
DIASTOLIC BLOOD PRESSURE: 67 MMHG | RESPIRATION RATE: 17 BRPM | SYSTOLIC BLOOD PRESSURE: 137 MMHG | BODY MASS INDEX: 35 KG/M2 | HEIGHT: 64 IN | HEART RATE: 67 BPM | WEIGHT: 205 LBS

## 2018-10-26 DIAGNOSIS — R31.9 HEMATURIA, UNSPECIFIED TYPE: Primary | ICD-10-CM

## 2018-10-26 DIAGNOSIS — Z78.0 MENOPAUSE: ICD-10-CM

## 2018-10-26 LAB
ORGANISM: ABNORMAL
URINE CULTURE, ROUTINE: ABNORMAL
URINE CULTURE, ROUTINE: ABNORMAL

## 2018-10-26 PROCEDURE — 1036F TOBACCO NON-USER: CPT | Performed by: OBSTETRICS & GYNECOLOGY

## 2018-10-26 PROCEDURE — 3017F COLORECTAL CA SCREEN DOC REV: CPT | Performed by: OBSTETRICS & GYNECOLOGY

## 2018-10-26 PROCEDURE — 99213 OFFICE O/P EST LOW 20 MIN: CPT | Performed by: OBSTETRICS & GYNECOLOGY

## 2018-10-26 PROCEDURE — G8484 FLU IMMUNIZE NO ADMIN: HCPCS | Performed by: OBSTETRICS & GYNECOLOGY

## 2018-10-26 PROCEDURE — G8427 DOCREV CUR MEDS BY ELIG CLIN: HCPCS | Performed by: OBSTETRICS & GYNECOLOGY

## 2018-10-26 PROCEDURE — G8417 CALC BMI ABV UP PARAM F/U: HCPCS | Performed by: OBSTETRICS & GYNECOLOGY

## 2018-11-30 ENCOUNTER — OFFICE VISIT (OUTPATIENT)
Dept: INTERNAL MEDICINE CLINIC | Age: 58
End: 2018-11-30
Payer: COMMERCIAL

## 2018-11-30 VITALS
HEIGHT: 64 IN | WEIGHT: 207 LBS | RESPIRATION RATE: 12 BRPM | HEART RATE: 62 BPM | BODY MASS INDEX: 35.34 KG/M2 | SYSTOLIC BLOOD PRESSURE: 120 MMHG | DIASTOLIC BLOOD PRESSURE: 80 MMHG

## 2018-11-30 DIAGNOSIS — Z13.220 SCREENING FOR HYPERLIPIDEMIA: ICD-10-CM

## 2018-11-30 DIAGNOSIS — Z00.00 ANNUAL PHYSICAL EXAM: Primary | ICD-10-CM

## 2018-11-30 DIAGNOSIS — Z13.1 SCREENING FOR DIABETES MELLITUS: ICD-10-CM

## 2018-11-30 DIAGNOSIS — E03.9 ACQUIRED HYPOTHYROIDISM: ICD-10-CM

## 2018-11-30 DIAGNOSIS — Z23 NEED FOR SHINGLES VACCINE: ICD-10-CM

## 2018-11-30 DIAGNOSIS — Z12.39 SCREENING FOR BREAST CANCER: ICD-10-CM

## 2018-11-30 LAB
BILIRUBIN, POC: NORMAL
BLOOD URINE, POC: NORMAL
CLARITY, POC: CLEAR
COLOR, POC: YELLOW
GLUCOSE URINE, POC: NORMAL
KETONES, POC: NORMAL
LEUKOCYTE EST, POC: NORMAL
NITRITE, POC: NORMAL
PH, POC: 5
PROTEIN, POC: NORMAL
SPECIFIC GRAVITY, POC: 1.01
UROBILINOGEN, POC: 0.2

## 2018-11-30 PROCEDURE — 99396 PREV VISIT EST AGE 40-64: CPT | Performed by: INTERNAL MEDICINE

## 2018-11-30 PROCEDURE — G8484 FLU IMMUNIZE NO ADMIN: HCPCS | Performed by: INTERNAL MEDICINE

## 2018-11-30 PROCEDURE — 93000 ELECTROCARDIOGRAM COMPLETE: CPT | Performed by: INTERNAL MEDICINE

## 2018-11-30 PROCEDURE — 81002 URINALYSIS NONAUTO W/O SCOPE: CPT | Performed by: INTERNAL MEDICINE

## 2018-11-30 ASSESSMENT — PATIENT HEALTH QUESTIONNAIRE - PHQ9
SUM OF ALL RESPONSES TO PHQ QUESTIONS 1-9: 0
SUM OF ALL RESPONSES TO PHQ9 QUESTIONS 1 & 2: 0
1. LITTLE INTEREST OR PLEASURE IN DOING THINGS: 0
2. FEELING DOWN, DEPRESSED OR HOPELESS: 0
SUM OF ALL RESPONSES TO PHQ QUESTIONS 1-9: 0

## 2018-11-30 NOTE — PATIENT INSTRUCTIONS
Information Statements are available in Bel[place]ian and other languages. See www.immunize.org/vis. Hojas de Información Sobre Vacunas están disponibles en Español y en muchos otros idiomas. Visite Nico.si   Care instructions adapted under license by [facility] ([facility]). If you have questions about a medical condition or this instruction, always ask your healthcare professional. [place] [address] any warranty or liability for your use of this information. Patient Education        Well Visit, Women 48 to 72: Care Instructions  Your Care Instructions    Physical exams can help you stay healthy. Your doctor has checked your overall health and may have suggested ways to take good care of yourself. He or she also may have recommended tests. At home, you can help prevent illness with healthy eating, regular exercise, and other steps. Follow-up care is a key part of your treatment and safety. Be sure to make and go to all appointments, and call your doctor if you are having problems. It's also a good idea to know your test results and keep a list of the medicines you take. How can you care for yourself at home? · Reach and stay at a healthy weight. This will lower your risk for many problems, such as obesity, diabetes, heart disease, and high blood pressure. · Get at least 30 minutes of exercise on most days of the week. Walking is a good choice. You also may want to do other activities, such as running, swimming, cycling, or playing tennis or team sports. · Do not smoke. Smoking can make health problems worse. If you need help quitting, talk to your doctor about stop-smoking programs and medicines. These can increase your chances of quitting for good. · Protect your skin from too much sun. When you're outdoors from 10 a.m. to 4 p.m., stay in the shade or cover up with clothing and a hat with a wide brim. Wear sunglasses that block UV rays.  Even when it's cloudy, put broad-spectrum sunscreen (SPF 30 or higher) on any exposed skin. · See a dentist one or two times a year for checkups and to have your teeth cleaned. · Wear a seat belt in the car. · Limit alcohol to 1 drink a day. Too much alcohol can cause health problems. Follow your doctor's advice about when to have certain tests. These tests can spot problems early. · Cholesterol. Your doctor will tell you how often to have this done based on your age, family history, or other things that can increase your risk for heart attack and stroke. · Blood pressure. Have your blood pressure checked during a routine doctor visit. Your doctor will tell you how often to check your blood pressure based on your age, your blood pressure results, and other factors. · Mammogram. Ask your doctor how often you should have a mammogram, which is an X-ray of your breasts. A mammogram can spot breast cancer before it can be felt and when it is easiest to treat. · Pap test and pelvic exam. Ask your doctor how often you should have a Pap test. You may not need to have a Pap test as often as you used to. · Vision. Have your eyes checked every year or two or as often as your doctor suggests. Some experts recommend that you have yearly exams for glaucoma and other age-related eye problems starting at age 48. · Hearing. Tell your doctor if you notice any change in your hearing. You can have tests to find out how well you hear. · Diabetes. Ask your doctor whether you should have tests for diabetes. · Colon cancer. You should begin tests for colon cancer at age 48. You may have one of several tests. Your doctor will tell you how often to have tests based on your age and risk. Risks include whether you already had a precancerous polyp removed from your colon or whether your parents, sisters and brothers, or children have had colon cancer. · Thyroid disease.  Talk to your doctor about whether to have your thyroid checked as part of a regular physical exam. Women

## 2018-11-30 NOTE — PROGRESS NOTES
Recommendations   1. Sunscreen use discussed and recommended  2. Continue a healthy lifestyle including a healthy diet and aerobic exercise  3. Update your eye exam every 2 years  4. Always wear a seatbelt  5. Always wear a helmet when riding a bike or motorcycle  6. See your dermatologist yearly for comprehensive of skin exam-- Dr. Jessica Tracy. Here are a few  Reliable websites with a variety of health and wellness information:   www.mylifecheck. heart. org     www.nutritionsource. org     www. americanheart. org     www. diabetes. org      www.menopause. org     www.[facility]     www.[facility]-.com [facility] site)

## 2018-12-26 RX ORDER — LEVOTHYROXINE SODIUM 0.05 MG/1
50 TABLET ORAL DAILY
Qty: 90 TABLET | Refills: 3 | Status: SHIPPED | OUTPATIENT
Start: 2018-12-26 | End: 2020-01-26 | Stop reason: SDUPTHER

## 2019-01-25 ENCOUNTER — HOSPITAL ENCOUNTER (OUTPATIENT)
Dept: WOMENS IMAGING | Age: 59
Discharge: HOME | End: 2019-01-25
Payer: COMMERCIAL

## 2019-01-25 DIAGNOSIS — Z12.39 SCREENING FOR BREAST CANCER: ICD-10-CM

## 2019-01-25 PROCEDURE — 77063 BREAST TOMOSYNTHESIS BI: CPT

## 2019-01-30 ENCOUNTER — TELEPHONE (OUTPATIENT)
Dept: INTERNAL MEDICINE CLINIC | Age: 59
End: 2019-01-30

## 2019-02-06 ENCOUNTER — HOSPITAL ENCOUNTER (OUTPATIENT)
Dept: WOMENS IMAGING | Age: 59
Discharge: HOME | End: 2019-02-06
Payer: COMMERCIAL

## 2019-02-06 ENCOUNTER — HOSPITAL ENCOUNTER (OUTPATIENT)
Dept: ULTRASOUND IMAGING | Age: 59
Discharge: HOME | End: 2019-02-06
Payer: COMMERCIAL

## 2019-02-06 DIAGNOSIS — R92.8 ABNORMAL MAMMOGRAM: ICD-10-CM

## 2019-02-06 PROCEDURE — G0279 TOMOSYNTHESIS, MAMMO: HCPCS

## 2019-02-06 PROCEDURE — 76642 ULTRASOUND BREAST LIMITED: CPT

## 2019-02-26 ENCOUNTER — OFFICE VISIT (OUTPATIENT)
Dept: INTERNAL MEDICINE CLINIC | Age: 59
End: 2019-02-26
Payer: COMMERCIAL

## 2019-02-26 VITALS
BODY MASS INDEX: 34.5 KG/M2 | OXYGEN SATURATION: 98 % | HEART RATE: 72 BPM | SYSTOLIC BLOOD PRESSURE: 124 MMHG | DIASTOLIC BLOOD PRESSURE: 78 MMHG | TEMPERATURE: 98.9 F | WEIGHT: 201 LBS

## 2019-02-26 DIAGNOSIS — N93.9 VAGINAL BLEEDING: ICD-10-CM

## 2019-02-26 DIAGNOSIS — R31.9 HEMATURIA, UNSPECIFIED TYPE: Primary | ICD-10-CM

## 2019-02-26 DIAGNOSIS — E03.9 ACQUIRED HYPOTHYROIDISM: ICD-10-CM

## 2019-02-26 LAB
BILIRUBIN, POC: NORMAL
BLOOD URINE, POC: NORMAL
CLARITY, POC: CLEAR
COLOR, POC: NORMAL
GLUCOSE URINE, POC: NORMAL
KETONES, POC: NORMAL
LEUKOCYTE EST, POC: NORMAL
NITRITE, POC: NEGATIVE
PH, POC: 7.5
PROTEIN, POC: NORMAL
SPECIFIC GRAVITY, POC: 1.01
UROBILINOGEN, POC: 2

## 2019-02-26 PROCEDURE — G8417 CALC BMI ABV UP PARAM F/U: HCPCS | Performed by: INTERNAL MEDICINE

## 2019-02-26 PROCEDURE — 3017F COLORECTAL CA SCREEN DOC REV: CPT | Performed by: INTERNAL MEDICINE

## 2019-02-26 PROCEDURE — 81002 URINALYSIS NONAUTO W/O SCOPE: CPT | Performed by: INTERNAL MEDICINE

## 2019-02-26 PROCEDURE — 99214 OFFICE O/P EST MOD 30 MIN: CPT | Performed by: INTERNAL MEDICINE

## 2019-02-26 PROCEDURE — 1036F TOBACCO NON-USER: CPT | Performed by: INTERNAL MEDICINE

## 2019-02-26 PROCEDURE — G8484 FLU IMMUNIZE NO ADMIN: HCPCS | Performed by: INTERNAL MEDICINE

## 2019-02-26 PROCEDURE — G8427 DOCREV CUR MEDS BY ELIG CLIN: HCPCS | Performed by: INTERNAL MEDICINE

## 2019-02-28 LAB — URINE CULTURE, ROUTINE: NORMAL

## 2019-03-07 ENCOUNTER — HOSPITAL ENCOUNTER (OUTPATIENT)
Dept: ULTRASOUND IMAGING | Age: 59
Discharge: HOME | End: 2019-03-07
Payer: COMMERCIAL

## 2019-03-07 ENCOUNTER — TELEPHONE (OUTPATIENT)
Dept: INTERNAL MEDICINE CLINIC | Age: 59
End: 2019-03-07

## 2019-03-07 DIAGNOSIS — N93.9 VAGINAL BLEEDING: ICD-10-CM

## 2019-03-07 DIAGNOSIS — N93.9 ABNORMAL VAGINAL BLEEDING: Primary | ICD-10-CM

## 2019-03-07 DIAGNOSIS — N93.9 ABNORMAL VAGINAL BLEEDING: ICD-10-CM

## 2019-03-07 PROCEDURE — 76856 US EXAM PELVIC COMPLETE: CPT

## 2019-03-07 PROCEDURE — 76830 TRANSVAGINAL US NON-OB: CPT

## 2019-03-11 ENCOUNTER — TELEPHONE (OUTPATIENT)
Dept: GYNECOLOGY | Age: 59
End: 2019-03-11

## 2019-04-18 ENCOUNTER — HOSPITAL ENCOUNTER (OUTPATIENT)
Dept: PREADMISSION TESTING | Age: 59
Discharge: HOME | End: 2019-04-22
Payer: COMMERCIAL

## 2019-04-18 DIAGNOSIS — Z01.818 ENCOUNTER FOR PREADMISSION TESTING: Primary | ICD-10-CM

## 2019-04-18 LAB
ABO/RH: NORMAL
ANION GAP SERPL CALCULATED.3IONS-SCNC: 7 MMOL/L (ref 3–16)
ANTIBODY SCREEN: NORMAL
BUN BLDV-MCNC: 15 MG/DL (ref 7–20)
CALCIUM SERPL-MCNC: 9.3 MG/DL (ref 8.3–10.6)
CHLORIDE BLD-SCNC: 106 MMOL/L (ref 99–110)
CO2: 30 MMOL/L (ref 21–32)
CREAT SERPL-MCNC: 0.8 MG/DL (ref 0.6–1.1)
GFR AFRICAN AMERICAN: >60
GFR NON-AFRICAN AMERICAN: >60
GLUCOSE BLD-MCNC: 92 MG/DL (ref 70–99)
GONADOTROPIN, CHORIONIC (HCG) QUANT: <5 MIU/ML
HCT VFR BLD CALC: 40.9 % (ref 36–48)
HEMOGLOBIN: 13.7 G/DL (ref 12–16)
MCH RBC QN AUTO: 29.9 PG (ref 26–34)
MCHC RBC AUTO-ENTMCNC: 33.5 G/DL (ref 31–36)
MCV RBC AUTO: 89.4 FL (ref 80–100)
PDW BLD-RTO: 12.8 % (ref 12.4–15.4)
PLATELET # BLD: 259 K/UL (ref 135–450)
PMV BLD AUTO: 7.3 FL (ref 5–10.5)
POTASSIUM SERPL-SCNC: 4.2 MMOL/L (ref 3.5–5.1)
RBC # BLD: 4.58 M/UL (ref 4–5.2)
SODIUM BLD-SCNC: 143 MMOL/L (ref 136–145)
WBC # BLD: 6 K/UL (ref 4–11)

## 2019-04-18 PROCEDURE — 86900 BLOOD TYPING SEROLOGIC ABO: CPT

## 2019-04-18 PROCEDURE — 93005 ELECTROCARDIOGRAM TRACING: CPT | Performed by: OBSTETRICS & GYNECOLOGY

## 2019-04-18 PROCEDURE — 86850 RBC ANTIBODY SCREEN: CPT

## 2019-04-18 PROCEDURE — 80048 BASIC METABOLIC PNL TOTAL CA: CPT

## 2019-04-18 PROCEDURE — 84702 CHORIONIC GONADOTROPIN TEST: CPT

## 2019-04-18 PROCEDURE — 86901 BLOOD TYPING SEROLOGIC RH(D): CPT

## 2019-04-18 PROCEDURE — 85027 COMPLETE CBC AUTOMATED: CPT

## 2019-04-19 LAB
EKG ATRIAL RATE: 68 BPM
EKG DIAGNOSIS: NORMAL
EKG P AXIS: 20 DEGREES
EKG P-R INTERVAL: 150 MS
EKG Q-T INTERVAL: 372 MS
EKG QRS DURATION: 88 MS
EKG QTC CALCULATION (BAZETT): 395 MS
EKG R AXIS: 16 DEGREES
EKG T AXIS: 29 DEGREES
EKG VENTRICULAR RATE: 68 BPM

## 2019-04-19 PROCEDURE — 93010 ELECTROCARDIOGRAM REPORT: CPT | Performed by: INTERNAL MEDICINE

## 2019-04-23 NOTE — PROGRESS NOTES
C-Difficile admission screening and protocol:     * Admitted with diarrhea?no     *Prior history of C-Diff. In last 3 months?no     *Antibiotic use in the past 6-8 weeks?no     *Prior hospitalization or nursing home in the last month?    no      [address] time___0730 per pt_________        Surgery time__0900 __________    Take the following medications with a sip of water:levothyroxine    Do not eat or drink anything after 12:00 midnight prior to your surgery. This includes water chewing gum, mints and ice chips. You may brush your teeth and gargle the morning of your surgery, but do not swallow the water     Please see your family doctor/pediatrician for a history and physical and/or concerning medications. Bring any test results/reports from your physicians office. If you are under the care of a heart doctor or specialist doctor, please be aware that you may be asked to them for clearance    You may be asked to stop blood thinners such as Coumadin, Plavix, Fragmin, Lovenox, etc., or any anti-inflammatories such as:  Aspirin, Ibuprofen, Advil, Naproxen prior to your surgery. We also ask that you stop any OTC medications such as fish oil, vitamin E, glucosamine, garlic, Multivitamins, COQ 10, etc.    We ask that you do not smoke 24 hours prior to surgery  We ask that you do not  drink any alcoholic beverages 24 hours prior to surgery     You must make arrangements for a responsible adult to take you home after your surgery. For your safety you will not be allowed to leave alone or drive yourself home. Your surgery will be cancelled if you do not have a ride home. Also for your safety, it is strongly suggested that someone stay with you the first 24 hours after your surgery. A parent or legal guardian must accompany a child scheduled for surgery and plan to stay at the hospital until the child is discharged.     Please do not bring other children with you. For your comfort, please wear simple loose fitting clothing to the hospital.  Please do not bring valuables. Do not wear any make-up or nail polish on your fingers or toes      For your safety, please do not wear any jewelry or body piercing's on the day of surgery. All jewelry must be removed. If you have dentures, they will be removed before going to operating room. For your convenience, we will provide you with a container. If you wear contact lenses or glasses, they will be removed, please bring a case for them. If you have a living will and a durable power of  for healthcare, please bring in a copy. As part of our patient safety program to minimize surgical site infections, we ask you to do the following:    · Please notify your surgeon if you develop any illness between         now and the  day of your surgery. · This includes a cough, cold, fever, sore throat, nausea,         or vomiting, and diarrhea, etc.  ·  Please notify your surgeon if you experience dizziness, shortness         of breath or blurred vision between now and the time of your surgery. Do not shave your operative site 96 hours prior to surgery. For face and neck surgery, men may use an electric razor 48 hours   prior to surgery. You may shower the night before surgery or the morning of   your surgery with an antibacterial soap. You will need to bring a photo ID and insurance card    [facility] has an onsite pharmacy, would you like to utilize our pharmacy     If you will be staying overnight and use a C-pap machine, please bring   your C-pap to hospital     Our goal is to provide you with excellent care, therefore, visitors will be limited to two(2) in the room at a time so that we may focus on providing this care for you. Please contact pre-admission testing if you have any further questions.                  [facility] phone number:  [phone] PAT fax number: [phone]  Please note these are generalized instructions for all surgical cases, you may be provided with more specific instructions according to your surgery.

## 2019-04-24 ENCOUNTER — ANESTHESIA EVENT (OUTPATIENT)
Dept: OPERATING ROOM | Age: 59
End: 2019-04-24
Payer: COMMERCIAL

## 2019-04-26 ENCOUNTER — ANESTHESIA (OUTPATIENT)
Dept: OPERATING ROOM | Age: 59
End: 2019-04-26
Payer: COMMERCIAL

## 2019-04-26 ENCOUNTER — HOSPITAL ENCOUNTER (OUTPATIENT)
Age: 59
Setting detail: OUTPATIENT SURGERY
Discharge: HOME | End: 2019-04-26
Attending: OBSTETRICS & GYNECOLOGY | Admitting: OBSTETRICS & GYNECOLOGY
Payer: COMMERCIAL

## 2019-04-26 VITALS
SYSTOLIC BLOOD PRESSURE: 107 MMHG | HEIGHT: 65 IN | DIASTOLIC BLOOD PRESSURE: 60 MMHG | TEMPERATURE: 97 F | RESPIRATION RATE: 18 BRPM | HEART RATE: 68 BPM | BODY MASS INDEX: 32.47 KG/M2 | WEIGHT: 194.89 LBS | OXYGEN SATURATION: 98 %

## 2019-04-26 VITALS
RESPIRATION RATE: 2 BRPM | DIASTOLIC BLOOD PRESSURE: 50 MMHG | SYSTOLIC BLOOD PRESSURE: 86 MMHG | OXYGEN SATURATION: 94 %

## 2019-04-26 DIAGNOSIS — N95.0 PMB (POSTMENOPAUSAL BLEEDING): ICD-10-CM

## 2019-04-26 DIAGNOSIS — Z98.890 S/P DILATION AND CURETTAGE: Primary | ICD-10-CM

## 2019-04-26 LAB
ABO/RH: NORMAL
ANTIBODY SCREEN: NORMAL

## 2019-04-26 PROCEDURE — 86901 BLOOD TYPING SEROLOGIC RH(D): CPT

## 2019-04-26 PROCEDURE — 7100000011 HC PHASE II RECOVERY - ADDTL 15 MIN: Performed by: OBSTETRICS & GYNECOLOGY

## 2019-04-26 PROCEDURE — 58558 HYSTEROSCOPY BIOPSY: CPT | Performed by: OBSTETRICS & GYNECOLOGY

## 2019-04-26 PROCEDURE — 3600000003 HC SURGERY LEVEL 3 BASE: Performed by: OBSTETRICS & GYNECOLOGY

## 2019-04-26 PROCEDURE — 2500000003 HC RX 250 WO HCPCS: Performed by: OBSTETRICS & GYNECOLOGY

## 2019-04-26 PROCEDURE — 6360000002 HC RX W HCPCS

## 2019-04-26 PROCEDURE — 2580000003 HC RX 258: Performed by: ANESTHESIOLOGY

## 2019-04-26 PROCEDURE — 2500000003 HC RX 250 WO HCPCS

## 2019-04-26 PROCEDURE — 86850 RBC ANTIBODY SCREEN: CPT

## 2019-04-26 PROCEDURE — 3700000001 HC ADD 15 MINUTES (ANESTHESIA): Performed by: OBSTETRICS & GYNECOLOGY

## 2019-04-26 PROCEDURE — 3700000000 HC ANESTHESIA ATTENDED CARE: Performed by: OBSTETRICS & GYNECOLOGY

## 2019-04-26 PROCEDURE — 3600000013 HC SURGERY LEVEL 3 ADDTL 15MIN: Performed by: OBSTETRICS & GYNECOLOGY

## 2019-04-26 PROCEDURE — 7100000000 HC PACU RECOVERY - FIRST 15 MIN: Performed by: OBSTETRICS & GYNECOLOGY

## 2019-04-26 PROCEDURE — 86900 BLOOD TYPING SEROLOGIC ABO: CPT

## 2019-04-26 PROCEDURE — 7100000001 HC PACU RECOVERY - ADDTL 15 MIN: Performed by: OBSTETRICS & GYNECOLOGY

## 2019-04-26 PROCEDURE — 88305 TISSUE EXAM BY PATHOLOGIST: CPT

## 2019-04-26 PROCEDURE — 2709999900 HC NON-CHARGEABLE SUPPLY: Performed by: OBSTETRICS & GYNECOLOGY

## 2019-04-26 PROCEDURE — 7100000010 HC PHASE II RECOVERY - FIRST 15 MIN: Performed by: OBSTETRICS & GYNECOLOGY

## 2019-04-26 RX ORDER — ONDANSETRON 2 MG/ML
INJECTION INTRAMUSCULAR; INTRAVENOUS PRN
Status: DISCONTINUED | OUTPATIENT
Start: 2019-04-26 | End: 2019-04-26 | Stop reason: SDUPTHER

## 2019-04-26 RX ORDER — FENTANYL CITRATE 50 UG/ML
25 INJECTION, SOLUTION INTRAMUSCULAR; INTRAVENOUS EVERY 5 MIN PRN
Status: DISCONTINUED | OUTPATIENT
Start: 2019-04-26 | End: 2019-04-26 | Stop reason: HOSPADM

## 2019-04-26 RX ORDER — OXYCODONE HYDROCHLORIDE AND ACETAMINOPHEN 5; 325 MG/1; MG/1
1 TABLET ORAL EVERY 4 HOURS PRN
Qty: 10 TABLET | Refills: 0 | Status: SHIPPED | OUTPATIENT
Start: 2019-04-26 | End: 2019-05-03

## 2019-04-26 RX ORDER — KETOROLAC TROMETHAMINE 30 MG/ML
INJECTION, SOLUTION INTRAMUSCULAR; INTRAVENOUS PRN
Status: DISCONTINUED | OUTPATIENT
Start: 2019-04-26 | End: 2019-04-26 | Stop reason: SDUPTHER

## 2019-04-26 RX ORDER — SODIUM CHLORIDE 9 MG/ML
INJECTION, SOLUTION INTRAVENOUS CONTINUOUS
Status: DISCONTINUED | OUTPATIENT
Start: 2019-04-26 | End: 2019-04-26 | Stop reason: HOSPADM

## 2019-04-26 RX ORDER — SODIUM CHLORIDE 0.9 % (FLUSH) 0.9 %
10 SYRINGE (ML) INJECTION EVERY 12 HOURS SCHEDULED
Status: DISCONTINUED | OUTPATIENT
Start: 2019-04-26 | End: 2019-04-26 | Stop reason: HOSPADM

## 2019-04-26 RX ORDER — PROMETHAZINE HYDROCHLORIDE 25 MG/ML
6.25 INJECTION, SOLUTION INTRAMUSCULAR; INTRAVENOUS
Status: DISCONTINUED | OUTPATIENT
Start: 2019-04-26 | End: 2019-04-26 | Stop reason: HOSPADM

## 2019-04-26 RX ORDER — MIDAZOLAM HYDROCHLORIDE 1 MG/ML
INJECTION INTRAMUSCULAR; INTRAVENOUS PRN
Status: DISCONTINUED | OUTPATIENT
Start: 2019-04-26 | End: 2019-04-26 | Stop reason: SDUPTHER

## 2019-04-26 RX ORDER — LIDOCAINE HYDROCHLORIDE 20 MG/ML
INJECTION, SOLUTION EPIDURAL; INFILTRATION; INTRACAUDAL; PERINEURAL PRN
Status: DISCONTINUED | OUTPATIENT
Start: 2019-04-26 | End: 2019-04-26 | Stop reason: SDUPTHER

## 2019-04-26 RX ORDER — SODIUM CHLORIDE 0.9 % (FLUSH) 0.9 %
10 SYRINGE (ML) INJECTION PRN
Status: DISCONTINUED | OUTPATIENT
Start: 2019-04-26 | End: 2019-04-26 | Stop reason: HOSPADM

## 2019-04-26 RX ORDER — PROPOFOL 10 MG/ML
INJECTION, EMULSION INTRAVENOUS PRN
Status: DISCONTINUED | OUTPATIENT
Start: 2019-04-26 | End: 2019-04-26 | Stop reason: SDUPTHER

## 2019-04-26 RX ORDER — FENTANYL CITRATE 50 UG/ML
INJECTION, SOLUTION INTRAMUSCULAR; INTRAVENOUS PRN
Status: DISCONTINUED | OUTPATIENT
Start: 2019-04-26 | End: 2019-04-26 | Stop reason: SDUPTHER

## 2019-04-26 RX ORDER — LABETALOL HYDROCHLORIDE 5 MG/ML
5 INJECTION, SOLUTION INTRAVENOUS EVERY 10 MIN PRN
Status: DISCONTINUED | OUTPATIENT
Start: 2019-04-26 | End: 2019-04-26 | Stop reason: HOSPADM

## 2019-04-26 RX ADMIN — PROPOFOL 150 MG: 10 INJECTION, EMULSION INTRAVENOUS at 09:55

## 2019-04-26 RX ADMIN — ONDANSETRON 4 MG: 2 INJECTION INTRAMUSCULAR; INTRAVENOUS at 10:05

## 2019-04-26 RX ADMIN — LIDOCAINE HYDROCHLORIDE 50 MG: 20 INJECTION, SOLUTION EPIDURAL; INFILTRATION; INTRACAUDAL; PERINEURAL at 09:55

## 2019-04-26 RX ADMIN — FENTANYL CITRATE 100 MCG: 50 INJECTION INTRAMUSCULAR; INTRAVENOUS at 09:55

## 2019-04-26 RX ADMIN — SODIUM CHLORIDE: 9 INJECTION, SOLUTION INTRAVENOUS at 08:26

## 2019-04-26 RX ADMIN — MIDAZOLAM 2 MG: 1 INJECTION INTRAMUSCULAR; INTRAVENOUS at 09:55

## 2019-04-26 RX ADMIN — KETOROLAC TROMETHAMINE 30 MG: 30 INJECTION, SOLUTION INTRAMUSCULAR at 10:05

## 2019-04-26 RX ADMIN — METRONIDAZOLE 500 MG: 500 INJECTION, SOLUTION INTRAVENOUS at 09:16

## 2019-04-26 RX ADMIN — SODIUM CHLORIDE: 9 INJECTION, SOLUTION INTRAVENOUS at 10:23

## 2019-04-26 ASSESSMENT — PULMONARY FUNCTION TESTS
PIF_VALUE: 0
PIF_VALUE: 17
PIF_VALUE: 1
PIF_VALUE: 0
PIF_VALUE: 0
PIF_VALUE: 18
PIF_VALUE: 18
PIF_VALUE: 1
PIF_VALUE: 17
PIF_VALUE: 21
PIF_VALUE: 1
PIF_VALUE: 18
PIF_VALUE: 1
PIF_VALUE: 2
PIF_VALUE: 3
PIF_VALUE: 22
PIF_VALUE: 17
PIF_VALUE: 16
PIF_VALUE: 1
PIF_VALUE: 1
PIF_VALUE: 5
PIF_VALUE: 0
PIF_VALUE: 1
PIF_VALUE: 16
PIF_VALUE: 2
PIF_VALUE: 0
PIF_VALUE: 3
PIF_VALUE: 0
PIF_VALUE: 18
PIF_VALUE: 3
PIF_VALUE: 2
PIF_VALUE: 17
PIF_VALUE: 4
PIF_VALUE: 3
PIF_VALUE: 1
PIF_VALUE: 21
PIF_VALUE: 1
PIF_VALUE: 0
PIF_VALUE: 3
PIF_VALUE: 17
PIF_VALUE: 17

## 2019-04-26 ASSESSMENT — PAIN SCALES - GENERAL
PAINLEVEL_OUTOF10: 0

## 2019-04-26 ASSESSMENT — PAIN - FUNCTIONAL ASSESSMENT: PAIN_FUNCTIONAL_ASSESSMENT: 0-10

## 2019-04-26 NOTE — PROGRESS NOTES
Discharge instructions given to patient and . Verbalize understanding. Script sent to retail pharmacy.

## 2019-04-26 NOTE — PROGRESS NOTES
Received from PACU. Admitted to Phase 2 care. Awake and alert, respirations easy and even. Oriented to room and surroundings. No complaints. Moderate amount of bloody vaginal drainage noted.

## 2019-04-26 NOTE — H&P
Patient is a 63year old F with thickened endometrial stripe and PMB. For hysteroscopy, D and C.      Review of Systems: as above  General ROS: negative  Psychological ROS: negative  Ophthalmic ROS: negative  ENT ROS: negative  Allergy and Immunology ROS: negative  Hematological and Lymphatic ROS: negative  Endocrine ROS: negative  Breast ROS: negative  Respiratory ROS: negative  Cardiovascular ROS: negative  Gastrointestinal ROS: negative  Genito-Urinary ROS: as above  Musculoskeletal ROS: negative  Neurological ROS: negative  Dermatological ROS: negative    Date of Birth 1960  Past Medical History:   Diagnosis Date    Abnormal Pap smear of cervix     Acquired hypothyroidism 3/20/2017    Anesthesia complication     \" doesn`t take much \"     DDD (degenerative disc disease), lumbar 2018    DJD (degenerative joint disease), lumbar     Hormone disorder     Low grade squamous intraepith lesion on cytologic smear vagina (lgsil) 2014    Post-menopausal bleeding 2018    Prolonged emergence from general anesthesia     Sigmoid diverticulosis     Steroid-induced psychosis      Past Surgical History:   Procedure Laterality Date    BREAST BIOPSY  2015    COLONOSCOPY      TONSILLECTOMY      TONSILLECTOMY AND ADENOIDECTOMY       OB History    Para Term  AB Living   2 2 2     2   SAB TAB Ectopic Molar Multiple Live Births             2      # Outcome Date GA Lbr Fabrizio/2nd Weight Sex Delivery Anes PTL Lv   2 Term 0 37w0d   F Vag-Spont   CASSIDY   1 Term 1 37w0d   M Vag-Spont   CASSIDY     Social History     Socioeconomic History    Marital status:      Spouse name: Not on file    Number of children: Not on file    Years of education: Not on file    Highest education level: Not on file   Occupational History    Not on file   Social Needs    Financial resource strain: Not on file    Food insecurity:     Worry: Not on file     Inability: Not on file   MedVentive Ovarian Cancer Neg Hx     Rashes/Skin Problems Neg Hx     Seizures Neg Hx     Thyroid Disease Neg Hx      /76   Pulse 55   Temp 97.4 °F (36.3 °C) (Temporal)   Resp 16   Ht 5' 5\" (1.651 m)   Wt 194 lb 14.2 oz (88.4 kg)   LMP 08/01/2016   SpO2 95%   BMI 32.43 kg/m²     WDWN in NAD  A and O x 3  HEENT-EOMI, mmm  CAR-RRR  Lungs-CTA  ABD-soft, NT, ND, no hsm  EXT-no c/c/e, no cords, NT  Neuro-grossly intact  PV-nl efg, Normal urethral meatus, nl urethra, nl bladder. , nl cervix, nl vagina, nl uterus with no masses, NT. Nl adnexa with no masses, NT.    Plan-pmb for hysteroscopy, d and c. All the risks, benefits alternatives discussed with patient including bleeding, blood transfusion, infection, damage to the bowel, bladder, other local organs with need for secondary surgery, anesthesia risks, blood clots in the lungs, legs, pelvis after surgery. Patient understands these risks and agrees to the procedure. Patient also understands there may be other unforseen risks.

## 2019-04-26 NOTE — ANESTHESIA PRE PROCEDURE
[facility] Department of Anesthesiology  Pre-Anesthesia Evaluation/Consultation       Name:  Nery Ayala  : 1960  Age:  63 y.o. MRN:  [id number]  Date: 2019           Surgeon: Surgeon(s):  Santosh Robertson MD    Procedure: Procedure(s): HYSTEROSCOPY, DILATION AND CURETTAGE     Allergies   Allergen Reactions    Lorazepam Other (See Comments)     Paradoxical reaction    Prednisone Other (See Comments)     Severe steroid psychosis \" can tolerate at loses for short periods\"     Patient Active Problem List   Diagnosis    Sigmoid diverticulosis    Acquired hypothyroidism     Past Medical History:   Diagnosis Date    Abnormal Pap smear of cervix     Acquired hypothyroidism 3/20/2017    Anesthesia complication     \" doesn`t take much \"     DDD (degenerative disc disease), lumbar 2018    DJD (degenerative joint disease), lumbar     Hormone disorder     Low grade squamous intraepith lesion on cytologic smear vagina (lgsil) 2014    Post-menopausal bleeding 2018    Prolonged emergence from general anesthesia     Sigmoid diverticulosis     Steroid-induced psychosis      Past Surgical History:   Procedure Laterality Date    BREAST BIOPSY  2015    COLONOSCOPY      TONSILLECTOMY      TONSILLECTOMY AND ADENOIDECTOMY       Social History     Tobacco Use    Smoking status: Former Smoker     Packs/day: 1.00     Years: 10.00     Pack years: 10.00     Last attempt to quit: 1996     Years since quittin.6    Smokeless tobacco: Never Used   Substance Use Topics    Alcohol use: No     Alcohol/week: 0.0 oz    Drug use: No     Comment: Vel Alfaro in college     Medications  No current facility-administered medications on file prior to encounter.       Current Outpatient Medications on File Prior to Encounter   Medication Sig Dispense Refill    levothyroxine (LEVOTHROID) 50 MCG tablet Take 1 tablet by mouth daily 90 tablet 3    ibuprofen (ADVIL;MOTRIN) 800 MG tablet Take 1 tablet by mouth nightly as needed 30 tablet 1    Tens Unit MISC by Does not apply route Apply daily as needed 1 each 0     Current Facility-Administered Medications   Medication Dose Route Frequency Provider Last Rate Last Dose    ceFAZolin (ANCEF) 2 g in dextrose 5 % 100 mL IVPB  2 g Intravenous Once Jeff Borden MD        metronidazole (FLAGYL) 500 mg in NaCl 100 mL IVPB premix  500 mg Intravenous Once Jeff Borden MD        0.9 % sodium chloride infusion   Intravenous Continuous Reyes Sanchez  mL/hr at 19      sodium chloride flush 0.9 % injection 10 mL  10 mL Intravenous 2 times per day Reyes Sanchez MD        sodium chloride flush 0.9 % injection 10 mL  10 mL Intravenous PRN Reyes Sanchez MD         Vital Signs (Current)   Vitals:    19   BP: 122/76   Pulse: 55   Resp: 16   Temp: 97.4 °F (36.3 °C)   SpO2: 95%     Vital Signs Statistics (for past 48 hrs)     Temp  Av.4 °F (36.3 °C)  Min: 97.4 °F (36.3 °C)   Min taken time: 19  Max: 97.4 °F (36.3 °C)   Max taken time: 19  Pulse  Av  Min: 54   Min taken time: 19  Max: 54   Max taken time: 19  Resp  Av  Min: 12   Min taken time: 19  Max: 16   Max taken time: 19  BP  Min: 122/76   Min taken time: 19  Max: 122/76   Max taken time: 19  SpO2  Av %  Min: 95 %   Min taken time: 19  Max: 95 %   Max taken time: 19    BP Readings from Last 3 Encounters:   19 122/76   19 124/78   18 120/80     BMI  Body mass index is 32.43 kg/m². Estimated body mass index is 32.43 kg/m² as calculated from the following:    Height as of this encounter: 5' 5\" (1.651 m). Weight as of this encounter: 194 lb 14.2 oz (88.4 kg).     CBC   Lab Results   Component Value Date    WBC 6.0 2019    RBC 4.58 2019    HGB 13.7 2019    HCT 40.9 2019 MCV 89.4 04/18/2019    RDW 12.8 04/18/2019     04/18/2019     CMP    Lab Results   Component Value Date     04/18/2019    K 4.2 04/18/2019     04/18/2019    CO2 30 04/18/2019    BUN 15 04/18/2019    CREATININE 0.8 04/18/2019    GFRAA >60 04/18/2019    GFRAA >60 07/05/2012    AGRATIO 2.0 11/30/2018    LABGLOM >60 04/18/2019    GLUCOSE 92 04/18/2019    PROT 7.1 11/30/2018    CALCIUM 9.3 04/18/2019    BILITOT 0.5 11/30/2018    ALKPHOS 119 11/30/2018    AST 19 11/30/2018    ALT 23 11/30/2018     BMP    Lab Results   Component Value Date     04/18/2019    K 4.2 04/18/2019     04/18/2019    CO2 30 04/18/2019    BUN 15 04/18/2019    CREATININE 0.8 04/18/2019    CALCIUM 9.3 04/18/2019    GFRAA >60 04/18/2019    GFRAA >60 07/05/2012    LABGLOM >60 04/18/2019    GLUCOSE 92 04/18/2019     POCGlucose  No results for input(s): GLUCOSE in the last 72 hours.    Coags  No results found for: PROTIME, INR, APTT  HCG (If Applicable) No results found for: PREGTESTUR, PREGSERUM, HCG, HCGQUANT   ABGs No results found for: PHART, PO2ART, CPN6AQQ, GFZ7PNU, BEART, O7TESYAI   Type & Screen (If Applicable)  No results found for: LABABO, LABRH                         BMI: Wt Readings from Last 3 Encounters:       NPO Status:   Date of last liquid consumption: 04/25/19   Time of last liquid consumption: 2100   Date of last solid food consumption: 04/25/19      Time of last solid consumption: 2100       Anesthesia Evaluation  Patient summary reviewed no history of anesthetic complications:   Airway: Mallampati: III  TM distance: >3 FB   Neck ROM: full   Dental: normal exam         Pulmonary:Negative Pulmonary ROS and normal exam                               Cardiovascular:Negative CV ROS  Exercise tolerance: good (>4 METS),           Rhythm: regular  Rate: normal           Beta Blocker:  Not on Beta Blocker         Neuro/Psych:               GI/Hepatic/Renal: Neg GI/Hepatic/Renal ROS            Endo/Other: (+) hypothyroidism::., .                 Abdominal:   (+) obese,         Vascular: negative vascular ROS. Anesthesia Plan      general     ASA 2       Induction: intravenous. MIPS: Postoperative opioids intended and Prophylactic antiemetics administered. Anesthetic plan and risks discussed with patient and spouse. Plan discussed with CRNA. This pre-anesthesia assessment may be used as a history and physical.    DOS STAFF ADDENDUM:    Pt seen and examined, chart reviewed (including anesthesia, drug and allergy history). No interval changes to history and physical examination. Anesthetic plan, risks, benefits, alternatives, and personnel involved discussed with patient. Patient verbalized an understanding and agrees to proceed.       Emilia Brunner, MD  April 26, 2019  8:29 AM

## 2019-04-26 NOTE — PROGRESS NOTES
Pt awake. Denies pain. Guillermina pad dry and intact.   To phase 2 care per stretcher with Rn assist.

## 2019-04-26 NOTE — BRIEF OP NOTE
Brief Postoperative Note  ______________________________________________________________    Patient: Julio Damon  YOB: 1960  MRN: [id number]  Date of Procedure: 4/26/2019    Pre-Op Diagnosis: POSTMENOPAUSAL BLEEDING, thickened endometrial stripe    Post-Op Diagnosis: Same, endometrial polyps       Procedure(s):   HYSTEROSCOPY, DILATION AND CURETTAGE    Anesthesia: General    Surgeon(s):  Rxoy Black MD    Assistant:     Estimated Blood Loss (mL): 50 ml    Complications: None    Specimens:   ID Type Source Tests Collected by Time Destination   A : A. endometrial curettings Tissue Tissue SURGICAL PATHOLOGY Roxy Black MD 4/26/2019 1017        Implants:  * No implants in log *      Drains: * No LDAs found *    Findings: endometrial polyps    Roxy Black MD  Date: 4/26/2019  Time: 10:31 AM

## 2019-04-27 NOTE — OP NOTE
[address] [facility]                                OPERATIVE REPORT    PATIENT NAME: Sarah Burrell                       :        1960  MED REC NO:   [id number]                          ROOM:  ACCOUNT NO:   [de-identified]                           ADMIT DATE: 2019  PROVIDER:     Nery Rutledge MD      DATE OF PROCEDURE:  2019    PREOPERATIVE DIAGNOSES:  Postmenopausal bleeding, thickened endometrial  stripe. POSTOPERATIVE DIAGNOSES:  Postmenopausal bleeding, thickened endometrial  stripe with endometrial polyp. OPERATION PERFORMED:  Hysteroscopy D&C and removal of endometrial polyp. SURGEON:  Nery Rutledge MD    ANESTHESIA:  General.    FINDINGS:  Endometrial polyps, endometrial curettings to Pathology. EBL:  Less than 100 mL. IV FLUIDS:  500 mL. COMPLICATIONS:  None. DISPOSITION:  The patient was taken to the recovery room in stable  condition. INDICATIONS:  The patient is a 79-year-old female who continued to have  some irregular postmenopausal bleeding, had had prior benign endometrial  biopsy in the office, however, continued to have bleeding and a  thickened endometrial stripe change from 7 mm to 10 mm. Decision was made to proceed  with hysteroscopy D&C. All the risks, benefits, and alternatives were  discussed with the patient including the risk of bleeding; blood  transfusion; infection; damage to the bowel, bladder or other local  organs; need for secondary surgery; blood clots to lungs, legs, pelvis  after surgery; anesthesia risk. The patient understands these risks and  agrees to the procedure. OPERATIVE PROCEDURE:  The patient was taken to the operating room where  her general anesthesia was found to be adequate. She was prepped and  draped in the normal sterile fashion in dorsal lithotomy position. Bladder was emptied prior to procedure.   Pneumo-boots were in place. Cervix was grasped with a single-tooth tenaculum. Uterus sounded  approximately 8 cm. Cervix dilated enough to put the hysteroscope. Hysteroscope was then passed into the uterine cavity. Uterine cavity  appeared to have a few endometrial polyps. Using the polyp forceps,  these were removed without difficulty. Sharp curettage was also done  until gritty texture was noted and all clots were removed from the  uterine cavity. Once we felt that the uterine cavity was empty,  decision was made to end the procedure. The cervix had been grasped  with a single-tooth tenaculum and this was removed. Hemostasis was  achieved. Approximately 150 mL of saline was used to enlarge the  uterine cavity. There were no complications. The patient was taken to  the recovery room in stable condition.         Hansa Patterson MD    D: 04/26/2019 18:03:20       T: 04/27/2019 3:47:44     [email or web address]  Job#: [id number]     Doc#: [id number]    CC:

## 2019-05-09 ENCOUNTER — OFFICE VISIT (OUTPATIENT)
Dept: GYNECOLOGY | Age: 59
End: 2019-05-09

## 2019-05-09 VITALS
BODY MASS INDEX: 33.8 KG/M2 | RESPIRATION RATE: 17 BRPM | HEART RATE: 81 BPM | TEMPERATURE: 98.4 F | SYSTOLIC BLOOD PRESSURE: 129 MMHG | WEIGHT: 198 LBS | HEIGHT: 64 IN | DIASTOLIC BLOOD PRESSURE: 66 MMHG

## 2019-05-09 DIAGNOSIS — Z98.890 POST-OPERATIVE STATE: Primary | ICD-10-CM

## 2019-05-09 PROCEDURE — 99024 POSTOP FOLLOW-UP VISIT: CPT | Performed by: OBSTETRICS & GYNECOLOGY

## 2019-05-10 NOTE — PROGRESS NOTES
Patient is post op d and c. Patient doing well. /66 (Site: Right Upper Arm, Position: Sitting, Cuff Size: Large Adult)   Pulse 81   Temp 98.4 °F (36.9 °C) (Oral)   Resp 17   Ht 5' 4\" (1.626 m)   Wt 198 lb (89.8 kg)   LMP 08/01/2016   Breastfeeding?  No   BMI 33.99 kg/m²     WDWN in NAD  A and O x 3  ABD-soft, NT, ND, no hsm  EXt- no c/c/e, no cords, Nt    Plan-s/p hysteroscopy, d and c  -healing well  Rtc for annual

## 2019-10-15 ENCOUNTER — PATIENT MESSAGE (OUTPATIENT)
Dept: INTERNAL MEDICINE CLINIC | Age: 59
End: 2019-10-15

## 2019-10-15 DIAGNOSIS — R92.8 ABNORMAL MAMMOGRAM: Primary | ICD-10-CM

## 2019-11-01 ENCOUNTER — HOSPITAL ENCOUNTER (OUTPATIENT)
Dept: WOMENS IMAGING | Age: 59
Discharge: HOME | End: 2019-11-01
Payer: COMMERCIAL

## 2019-11-01 DIAGNOSIS — R92.8 ABNORMAL MAMMOGRAM: ICD-10-CM

## 2019-11-01 PROCEDURE — G0279 TOMOSYNTHESIS, MAMMO: HCPCS

## 2020-01-02 ENCOUNTER — OFFICE VISIT (OUTPATIENT)
Dept: INTERNAL MEDICINE CLINIC | Age: 60
End: 2020-01-02
Payer: COMMERCIAL

## 2020-01-02 VITALS
DIASTOLIC BLOOD PRESSURE: 80 MMHG | SYSTOLIC BLOOD PRESSURE: 130 MMHG | RESPIRATION RATE: 12 BRPM | WEIGHT: 206 LBS | HEART RATE: 64 BPM | BODY MASS INDEX: 35.36 KG/M2

## 2020-01-02 PROCEDURE — G8427 DOCREV CUR MEDS BY ELIG CLIN: HCPCS | Performed by: INTERNAL MEDICINE

## 2020-01-02 PROCEDURE — 99214 OFFICE O/P EST MOD 30 MIN: CPT | Performed by: INTERNAL MEDICINE

## 2020-01-02 PROCEDURE — G8417 CALC BMI ABV UP PARAM F/U: HCPCS | Performed by: INTERNAL MEDICINE

## 2020-01-02 PROCEDURE — 1036F TOBACCO NON-USER: CPT | Performed by: INTERNAL MEDICINE

## 2020-01-02 PROCEDURE — G8484 FLU IMMUNIZE NO ADMIN: HCPCS | Performed by: INTERNAL MEDICINE

## 2020-01-02 PROCEDURE — 3017F COLORECTAL CA SCREEN DOC REV: CPT | Performed by: INTERNAL MEDICINE

## 2020-01-02 RX ORDER — TIZANIDINE 4 MG/1
2-4 TABLET ORAL NIGHTLY
Qty: 30 TABLET | Refills: 0 | Status: SHIPPED | OUTPATIENT
Start: 2020-01-02 | End: 2020-03-03 | Stop reason: ALTCHOICE

## 2020-01-02 NOTE — PROGRESS NOTES
MEDICATIONS:  Prior to Visit Medications    Medication Sig Taking? Authorizing Provider   tiZANidine (ZANAFLEX) 4 MG tablet Take 0.5-1 tablets by mouth nightly Yes Jonatan Garcia DO   levothyroxine (LEVOTHROID) 50 MCG tablet Take 1 tablet by mouth daily Yes Jonatan Garcia DO   ibuprofen (ADVIL;MOTRIN) 800 MG tablet Take 1 tablet by mouth nightly as needed  Jonatan Garcia DO   Tens Unit MISC by Does not apply route Apply daily as needed  [employer], DO           Review of Systems - As per HPI  GEN: Pt denies fever, chills or night sweats. Denies weight changes. Appetite good  HEENT: Pt denies visual and auditory changes, epistaxis, upper respiratory symptoms and dysphagia  CV: Pt denies CP, SOB, GEIGER, orthopnea palpitations, LE swelling, and claudication. PULM: Pt denies cough, wheezing or sputum production  GI: Pt denies N/V/D, heart burn, rectal bleeding, or melena. NEURO: Pt denies unilateral weakness, paresthesia and dizziness. OBJECTIVE:  Vitals:    01/02/20 1001 01/02/20 1026 01/02/20 1027 01/02/20 1030   BP: 126/82 (!) 142/90 (!) 146/82 130/80   Pulse: 64      Resp: 12      Weight: 206 lb (93.4 kg)        Body mass index is 35.36 kg/m². Wt Readings from Last 3 Encounters:   01/02/20 206 lb (93.4 kg)   05/09/19 198 lb (89.8 kg)   04/26/19 194 lb 14.2 oz (88.4 kg)     BP Readings from Last 3 Encounters:   01/02/20 130/80   05/09/19 129/66   04/26/19 (!) 86/50      GEN: NAD, A&O, Non-toxic  HEENT: NC/AT, RAMOS, EOMI, Oral cavity Clear,  TM's NL, Nasal cavity clear. NECK: Supple. No thyromegaly. LYMPH: No C/SC nodes. CV: S1 S2 NL, RRR. No murmurs, clicks or rubs. PULM: CTA, symmentric air exchange  EXT: No C/C/E  GI: Abdomen is soft, NT, BS+, No hepatomegaly. NEURO: No focal or lateralizing deficits. VASC:  No carotid bruits. Pulses symmetric    ASSESSMENT/PLAN:    1. Blood pressure elevated without history of HTN  This is a new problem.   This may be essential hypertension or be related to the high-dose aspirin she has been taking. Lifestyle modification. Literature provided on the DASH diet and low-sodium diet  Blood pressure monitoring at home. The machine at work may be inaccurate. She will return in 4 to 6 weeks with blood pressure readings. She may need some blood pressure medications  - CBC Auto Differential; Future  - Comprehensive Metabolic Panel; Future  - Lipid Panel; Future  - TSH without Reflex; Future    2. Cervical pain  This is a recurrent problem. She has been evaluated at [facility] a few years back. No correspondence of course. Repeat plain films of the C-spine  Patient will likely need a course of physical therapy  As far as NSAIDs are concerned patient recommended to stop the high-dose aspirin. This could be contributing to salt and water retention. We may have to revert to something like meloxicam  - XR CERVICAL SPINE (2-3 VIEWS); Future  - tiZANidine (ZANAFLEX) 4 MG tablet; Take 0.5-1 tablets by mouth nightly  Dispense: 30 tablet; Refill: 0    3. DDD (degenerative disc disease), cervical  As above  Home exercises provided  - XR CERVICAL SPINE (2-3 VIEWS); Future  - tiZANidine (ZANAFLEX) 4 MG tablet; Take 0.5-1 tablets by mouth nightly  Dispense: 30 tablet; Refill: 0    4. Acquired hypothyroidism  Condition stability and control are uncertain at this time. Overdue for TSH  Recheck lab  Continue current levothyroxine dose for now  - Comprehensive Metabolic Panel; Future  - Lipid Panel; Future  - TSH without Reflex; Future            Discussed medications with patient who voiced understanding of their use, indication and potential side effects. Pt also understands the above recommendations. All questions answered. This note was generated completely or in part utilizing Dragon dictation speech recognition software. Occasionally, words are mistranscribed and despite editing, the text may contain inaccuracies due to incorrect word recognition.

## 2020-01-02 NOTE — PATIENT INSTRUCTIONS
Your may have hypertension. You need to have blood pressure repeated at home and in the office. Please get a BP monitor for home. The machine at work may not be accurate. Bring your machine into the office for you next visit. Please discontinue the high-dose aspirin you are taking. Try a low-dose of a muscle relaxant at nighttime. See medication list.  Patient Education        Cervical Disc Disease: Care Instructions  Your Care Instructions    Cervical disc disease results from damage, disease, or wear and tear to the discs between the bones (vertebra) in your neck. The discs act as shock absorbers for the spine and keep the spine flexible. When a disc is damaged, it can bulge out and press against the nerve roots or spinal cord. This is sometimes called a herniated or \"slipped disc. \" This pressure can cause pain and numbness or tingling in your arms and hands. It can also cause weakness in your legs. An accident can damage a disc and cause it to break open (rupture). Aging and hard physical work can also cause damage to cervical discs. The first treatments for cervical disc disease include physical therapy, special neck exercises, heat, and pain medicine. If these fail, your doctor may inject steroids and pain medicine into your neck. Surgery is usually done only if other treatments have not worked. Follow-up care is a key part of your treatment and safety. Be sure to make and go to all appointments, and call your doctor if you are having problems. It's also a good idea to know your test results and keep a list of the medicines you take. How can you care for yourself at home? · Take pain medicines exactly as directed. ? If the doctor gave you a prescription medicine for pain, take it as prescribed. ? If you are not taking a prescription pain medicine, ask your doctor if you can take an over-the-counter medicine. · Don't spend too long in one position.  Take short breaks to move around and V723 in the [facility] box to learn more about \"Neck Pain: Care Instructions. \"     If you do not have an account, please click on the \"Sign Up Now\" link. Current as of: September 20, 2018  Content Version: 12.1  © 1446-1805 Healthwise, Incorporated. Care instructions adapted under license by [facility] ([facility]). If you have questions about a medical condition or this instruction, always ask your healthcare professional. [address] any warranty or liability for your use of this information. Patient Education        Neck: Exercises  Introduction  Here are some examples of exercises for you to try. The exercises may be suggested for a condition or for rehabilitation. Start each exercise slowly. Ease off the exercises if you start to have pain. You will be told when to start these exercises and which ones will work best for you. How to do the exercises  Neck stretch    1. This stretch works best if you keep your shoulder down as you lean away from it. To help you remember to do this, start by relaxing your shoulders and lightly holding on to your thighs or your chair. 2. Tilt your head toward your shoulder and hold for 15 to 30 seconds. Let the weight of your head stretch your muscles. 3. If you would like a little added stretch, use your hand to gently and steadily pull your head toward your shoulder. For example, keeping your right shoulder down, lean your head to the left. 4. Repeat 2 to 4 times toward each shoulder. Diagonal neck stretch    1. Turn your head slightly toward the direction you will be stretching, and tilt your head diagonally toward your chest and hold for 15 to 30 seconds. 2. If you would like a little added stretch, use your hand to gently and steadily pull your head forward on the diagonal.  3. Repeat 2 to 4 times toward each side. Dorsal glide stretch    1. Sit or stand tall and look straight ahead.   2. Slowly tuck your chin as you glide your head backward over your body  3. Hold for a count of 6, and then relax for up to 10 seconds. 4. Repeat 8 to 12 times. Chest and shoulder stretch    1. Sit or stand tall and glide your head backward as in the dorsal glide stretch. 2. Raise both arms so that your hands are next to your ears. 3. Take a deep breath, and as you breathe out, lower your elbows down and behind your back. You will feel your shoulder blades slide down and together, and at the same time you will feel a stretch across your chest and the front of your shoulders. 4. Hold for about 6 seconds, and then relax for up to 10 seconds. 5. Repeat 8 to 12 times. Strengthening: Hands on head    1. Move your head backward, forward, and side to side against gentle pressure from your hands, holding each position for about 6 seconds. 2. Repeat 8 to 12 times. Follow-up care is a key part of your treatment and safety. Be sure to make and go to all appointments, and call your doctor if you are having problems. It's also a good idea to know your test results and keep a list of the medicines you take. Where can you learn more? Go to [email or web address]"[employer]Wimdu. org and sign in to your [employer] account. Enter P975 in the [employer] box to learn more about \"Neck: Exercises. \"     If you do not have an account, please click on the \"Sign Up Now\" link. Current as of: September 20, 2018  Content Version: 12.1  © 2833-1930 Healthwise, Incorporated. Care instructions adapted under license by [facility] ([facility]). If you have questions about a medical condition or this instruction, always ask your healthcare professional. [place] [address] any warranty or liability for your use of this information. Patient Education        Elevated Blood Pressure: Care Instructions  Your Care Instructions    Blood pressure is a measure of how hard the blood pushes against the walls of your arteries.  It's normal for blood pressure to go up and down throughout the day. But if it stays up over time, you have high blood pressure. Two numbers tell you your blood pressure. The first number is the systolic pressure. It shows how hard the blood pushes when your heart is pumping. The second number is the diastolic pressure. It shows how hard the blood pushes between heartbeats, when your heart is relaxed and filling with blood. An ideal blood pressure in adults is less than 120/80 (say \"120 over 80\"). High blood pressure is 140/90 or higher. You have high blood pressure if your top number is 140 or higher or your bottom number is 90 or higher, or both. The main test for high blood pressure is simple, fast, and painless. To diagnose high blood pressure, your doctor will test your blood pressure at different times. After testing your blood pressure, your doctor may ask you to test it again when you are home. If you are diagnosed with high blood pressure, you can work with your doctor to make a long-term plan to manage it. Follow-up care is a key part of your treatment and safety. Be sure to make and go to all appointments, and call your doctor if you are having problems. It's also a good idea to know your test results and keep a list of the medicines you take. How can you care for yourself at home? · Do not smoke. Smoking increases your risk for heart attack and stroke. If you need help quitting, talk to your doctor about stop-smoking programs and medicines. These can increase your chances of quitting for good. · Stay at a healthy weight. · Try to limit how much sodium you eat to less than 2,300 milligrams (mg) a day. Your doctor may ask you to try to eat less than 1,500 mg a day. · Be physically active. Get at least 30 minutes of exercise on most days of the week. Walking is a good choice. You also may want to do other activities, such as running, swimming, cycling, or playing tennis or team sports. · Avoid or limit alcohol.  Talk to your doctor about whether you can drink any alcohol. · Eat plenty of fruits, vegetables, and low-fat dairy products. Eat less saturated and total fats. · Learn how to check your blood pressure at home. When should you call for help? Call your doctor now or seek immediate medical care if:    · Your blood pressure is much higher than normal (such as 180/110 or higher).     · You think high blood pressure is causing symptoms such as:  ¨ Severe headache. ¨ Blurry vision.    Watch closely for changes in your health, and be sure to contact your doctor if:    · You do not get better as expected. Where can you learn more? Go to https://[employer]. org and sign in to your [employer] account. Enter L141 in the [employer] box to learn more about \"Elevated Blood Pressure: Care Instructions. \"     If you do not have an account, please click on the \"Sign Up Now\" link. Current as of: May 10, 2017  Content Version: 11.6  © 7811-7676 [employer], Incorporated. Care instructions adapted under license by [facility] ([facility]). If you have questions about a medical condition or this instruction, always ask your healthcare professional. [place] [address] any warranty or liability for your use of this information.

## 2020-01-07 ENCOUNTER — OFFICE VISIT (OUTPATIENT)
Dept: INTERNAL MEDICINE CLINIC | Age: 60
End: 2020-01-07
Payer: COMMERCIAL

## 2020-01-07 VITALS — DIASTOLIC BLOOD PRESSURE: 90 MMHG | SYSTOLIC BLOOD PRESSURE: 136 MMHG

## 2020-01-07 PROCEDURE — G8417 CALC BMI ABV UP PARAM F/U: HCPCS | Performed by: INTERNAL MEDICINE

## 2020-01-07 PROCEDURE — 3017F COLORECTAL CA SCREEN DOC REV: CPT | Performed by: INTERNAL MEDICINE

## 2020-01-07 PROCEDURE — G8428 CUR MEDS NOT DOCUMENT: HCPCS | Performed by: INTERNAL MEDICINE

## 2020-01-07 PROCEDURE — 99213 OFFICE O/P EST LOW 20 MIN: CPT | Performed by: INTERNAL MEDICINE

## 2020-01-07 PROCEDURE — 1036F TOBACCO NON-USER: CPT | Performed by: INTERNAL MEDICINE

## 2020-01-07 PROCEDURE — G8484 FLU IMMUNIZE NO ADMIN: HCPCS | Performed by: INTERNAL MEDICINE

## 2020-01-07 RX ORDER — TRIAMCINOLONE ACETONIDE 1 MG/G
CREAM TOPICAL
Qty: 60 G | Refills: 0 | Status: SHIPPED | OUTPATIENT
Start: 2020-01-07 | End: 2021-01-15 | Stop reason: ALTCHOICE

## 2020-01-07 NOTE — PROGRESS NOTES
[facility]) Physicians  Internal Medicine  Patient Encounter  Anibal Arriaga D.O., [facility]        Chief Complaint   Patient presents with    Rash       HPI: 63 y.o. female seen urgently today with complaint of a skin rash. Patient was concerned that the rash was due to the tizanidine which was ordered on 1/2/2024 cervical spine pain. She states she woke with red bumps on torso this AM.  Yesterday, she felt well and did not have a rash or itching. She denies any known infestations. She has two dogs who are OK. These bumps do not itch. The lesions are sore to the touch. Interestingly this past Sunday, 3 days ago, the patient did attend a movie theater. 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 Authorizing Provider   triamcinolone (KENALOG) 0.1 % cream Apply topically 2 times daily for 10-14 days.  Yes Jonatan Garcia, DO   tiZANidine (ZANAFLEX) 4 MG tablet Take 0.5-1 tablets by mouth nightly  Jonatan Garcia DO   levothyroxine (LEVOTHROID) 50 MCG tablet Take 1 tablet by mouth daily  Jonatan Garcia DO   ibuprofen (ADVIL;MOTRIN) 800 MG tablet Take 1 tablet by mouth nightly as needed  Jonatan Garcia DO   Tens Unit MISC by Does not apply route Apply daily as needed  Anibal Arriaga, DO           Review of Systems - As per HPI      OBJECTIVE:  BP (!) 136/90   LMP 08/01/2016   GEN: NAD, A&O, Non-toxic  HEENT: NC/AT, RAMOS, EOMI, Oral cavity Clear,  TM's NL, Nasal cavity clear.    SKIN: Left abdomen, left side and left lower back along the waistline with multiple discrete erythematous papules with a central punctum. A few of the lesions have an erythematous flare. No pustules. The punctum does not appear hemorrhagic. ASSESSMENT[de-identified]  Colin Turner was seen today for rash. Diagnoses and all orders for this visit:    Skin rash  -     triamcinolone (KENALOG) 0.1 % cream; Apply topically 2 times daily for 10-14 days. Insect bite of abdominal wall, initial encounter  -     triamcinolone (KENALOG) 0.1 % cream; Apply topically 2 times daily for 10-14 days. Differential diagnosis does include bedbug bites      Additional Plan:  1. We will spot treat with triamcinolone cream.  2.  Inspect home particularly the mattress cords. Advised to call or return to the office if there are further questions, or if these symptoms fail to improve as anticipated or worsen. Discussed medications with patient who voiced understanding of their use, indication and potential side effects. Pt also understands the above recommendations. All questions answered. This note was generated completely or in part utilizing Dragon dictation speech recognition software. Occasionally, words are mistranscribed and despite editing, the text may contain inaccuracies due to incorrect word recognition.   If further clarification is needed please contact the office at [phone]

## 2020-01-07 NOTE — PATIENT INSTRUCTIONS
irritation or inflammation of the skin. Rashes have many possible causes, including allergy, infection, illness, heat, and emotional stress. Follow-up care is a key part of your treatment and safety. Be sure to make and go to all appointments, and call your doctor if you are having problems. It's also a good idea to know your test results and keep a list of the medicines you take. How can you care for yourself at home? · Wash the area with water only. Soap can make dryness and itching worse. Pat dry. · Put cold, wet cloths on the rash to reduce itching. · Keep cool, and stay out of the sun. · Leave the rash open to the air as much of the time as possible. · Sometimes petroleum jelly (Vaseline) can help relieve the discomfort caused by a rash. A moisturizing lotion, such as Cetaphil, also may help. Calamine lotion may help for rashes caused by contact with something (such as a plant or soap) that irritated the skin. Use it 3 or 4 times a day. · If your doctor prescribed a cream, use it as directed. If your doctor prescribed medicine, take it exactly as directed. · If your rash itches so badly that it interferes with your normal activities, take an over-the-counter antihistamine, such as diphenhydramine (Benadryl) or loratadine (Claritin). Read and follow all instructions on the label. When should you call for help? Call your doctor now or seek immediate medical care if:    · You have signs of infection, such as:  ? Increased pain, swelling, warmth, or redness. ? Red streaks leading from the area. ? Pus draining from the area. ? A fever.     · You have joint pain along with the rash.    Watch closely for changes in your health, and be sure to contact your doctor if:    · Your rash is changing or getting worse. For example, call if you have pain along with the rash, the rash is spreading, or you have new blisters.     · You do not get better after 1 week. Where can you learn more?   Go to https://chpepiceweb.[employer]. org and sign in to your [employer] account. Enter J221 in the [employer] box to learn more about \"Rash: Care Instructions. \"     If you do not have an account, please click on the \"Sign Up Now\" link. Current as of: April 1, 2019  Content Version: 12.1  © 7308-3452 [employer]. Care instructions adapted under license by [facility] ([facility]). If you have questions about a medical condition or this instruction, always ask your healthcare professional. [address] any warranty or liability for your use of this information. Patient Education        Bedbugs: Care Instructions  Your Care Instructions    Bedbugs are tiny bugs that feed on blood from animals or people. They have that name because they like to hide in bedding and mattresses. Bedbugs are not known to spread disease to people. But itching from the bites can be so bad that you may scratch hard enough to break the skin. That can lead to infection. The bites can also cause an allergic reaction in some people. The bugs can spread into cracks and crevices in the room and lay their eggs in anything that is in the room, including clothing and furniture. This makes them very hard to kill. Getting rid of bedbugs  The best way to get rid of bedbugs is to call a professional pest control company. They use special pesticides and other equipment to kill the bugs and their eggs. If you decide to buy your own pesticide, check the label. Make sure it says that it is for bedbugs. Be sure to follow the directions carefully. You may have to use the pesticide more than once. Do other cleaning steps such as:  · Vacuum often. Be sure to empty the vacuum after each use. If you use a vacuum bag, seal it and throw it out in an outdoor trash can. If you don't use a vacuum bag, empty the container and clean it with hot, soapy water. · Launder things that might hide bugs.  Washing and then drying

## 2020-01-17 ENCOUNTER — HOSPITAL ENCOUNTER (OUTPATIENT)
Age: 60
Discharge: HOME | End: 2020-01-17
Payer: COMMERCIAL

## 2020-01-17 ENCOUNTER — HOSPITAL ENCOUNTER (OUTPATIENT)
Dept: GENERAL RADIOLOGY | Age: 60
Discharge: HOME | End: 2020-01-17
Payer: COMMERCIAL

## 2020-01-17 PROCEDURE — 72040 X-RAY EXAM NECK SPINE 2-3 VW: CPT

## 2020-01-27 RX ORDER — LEVOTHYROXINE SODIUM 0.05 MG/1
50 TABLET ORAL DAILY
Qty: 90 TABLET | Refills: 1 | Status: SHIPPED | OUTPATIENT
Start: 2020-01-27 | End: 2020-08-13 | Stop reason: SDUPTHER

## 2020-01-28 ENCOUNTER — OFFICE VISIT (OUTPATIENT)
Dept: INTERNAL MEDICINE CLINIC | Age: 60
End: 2020-01-28
Payer: COMMERCIAL

## 2020-01-28 VITALS
DIASTOLIC BLOOD PRESSURE: 80 MMHG | WEIGHT: 206 LBS | BODY MASS INDEX: 35.36 KG/M2 | HEART RATE: 76 BPM | SYSTOLIC BLOOD PRESSURE: 122 MMHG

## 2020-01-28 PROCEDURE — 99213 OFFICE O/P EST LOW 20 MIN: CPT | Performed by: INTERNAL MEDICINE

## 2020-01-28 PROCEDURE — 1036F TOBACCO NON-USER: CPT | Performed by: INTERNAL MEDICINE

## 2020-01-28 PROCEDURE — G8484 FLU IMMUNIZE NO ADMIN: HCPCS | Performed by: INTERNAL MEDICINE

## 2020-01-28 PROCEDURE — 3017F COLORECTAL CA SCREEN DOC REV: CPT | Performed by: INTERNAL MEDICINE

## 2020-01-28 PROCEDURE — G8417 CALC BMI ABV UP PARAM F/U: HCPCS | Performed by: INTERNAL MEDICINE

## 2020-01-28 PROCEDURE — G8427 DOCREV CUR MEDS BY ELIG CLIN: HCPCS | Performed by: INTERNAL MEDICINE

## 2020-01-28 NOTE — PATIENT INSTRUCTIONS
you can lower your blood pressure even more. ? Buy foods that are labeled \"unsalted,\" \"sodium-free,\" or \"low-sodium. \" Foods labeled \"reduced-sodium\" and \"light sodium\" may still have too much sodium. ? Flavor your food with garlic, lemon juice, onion, vinegar, herbs, and spices instead of salt. Do not use soy sauce, steak sauce, onion salt, garlic salt, mustard, or ketchup on your food. ? Use less salt (or none) when recipes call for it. You can often use half the salt a recipe calls for without losing flavor. · Be physically active. Get at least 30 minutes of exercise on most days of the week. Walking is a good choice. You also may want to do other activities, such as running, swimming, cycling, or playing tennis or team sports. · Limit alcohol to 2 drinks a day for men and 1 drink a day for women. · Eat plenty of fruits, vegetables, and low-fat dairy products. Eat less saturated and total fats. How is high blood pressure treated? · Your doctor will suggest making lifestyle changes to help your heart. For example, your doctor may ask you to eat healthy foods, quit smoking, lose extra weight, and be more active. · If lifestyle changes don't help enough, your doctor may recommend that you take medicine. · When blood pressure is very high, medicines are needed to lower it. Follow-up care is a key part of your treatment and safety. Be sure to make and go to all appointments, and call your doctor if you are having problems. It's also a good idea to know your test results and keep a list of the medicines you take. Where can you learn more? Go to https://chpepiceweb.Healthways. org and sign in to your HedgeCo account. Enter P501 in the "[employer]" box to learn more about \"Learning About High Blood Pressure. \"     If you do not have an account, please click on the \"Sign Up Now\" link. Current as of: April 9, 2019  Content Version: 12.3  © 4000-4043 Healthwise, Incorporated.  Care instructions in the chest.  ? Sweating. ? Shortness of breath. ? Nausea or vomiting. ? Pain, pressure, or a strange feeling in the back, neck, jaw, or upper belly or in one or both shoulders or arms. ? Lightheadedness or sudden weakness. ? A fast or irregular heartbeat.     · You have symptoms of a stroke. These may include:  ? Sudden numbness, tingling, weakness, or loss of movement in your face, arm, or leg, especially on only one side of your body. ? Sudden vision changes. ? Sudden trouble speaking. ? Sudden confusion or trouble understanding simple statements. ? Sudden problems with walking or balance. ? A sudden, severe headache that is different from past headaches.     · You have severe back or belly pain.    Do not wait until your blood pressure comes down on its own. Get help right away.   Call your doctor now or seek immediate care if:    · Your blood pressure is much higher than normal (such as 180/120 or higher), but you don't have symptoms.     · You think high blood pressure is causing symptoms, such as:  ? Severe headache.  ? Blurry vision.    Watch closely for changes in your health, and be sure to contact your doctor if:    · Your blood pressure measures higher than your doctor recommends at least 2 times. That means the top number is higher or the bottom number is higher, or both.     · You think you may be having side effects from your blood pressure medicine. Where can you learn more? Go to https://Amirite.comvioletteMicrofabrica.Avesthagen. org and sign in to your NotaryAct account. Enter A929 in the E[employer] E[employer] box to learn more about \"High Blood Pressure: Care Instructions. \"     If you do not have an account, please click on the \"Sign Up Now\" link. Current as of: April 9, 2019  Content Version: 12.3  © 0385-4224 Healthwise, Incorporated. Care instructions adapted under license by [facility] ([facility]).  If you have questions about a medical condition or this instruction, always ask your healthcare

## 2020-01-28 NOTE — PROGRESS NOTES
DO Radha   triamcinolone (KENALOG) 0.1 % cream Apply topically 2 times daily for 10-14 days. Jonatan Garcia DO   ibuprofen (ADVIL;MOTRIN) 800 MG tablet Take 1 tablet by mouth nightly as needed  Jonatan Garcia DO   Tens Unit MISC by Does not apply route Apply daily as needed  [employer], DO         Review of Systems - As per HPI  GEN: Pt denies fever, chills or night sweats. Denies weight changes. Appetite good  HEENT: Pt denies visual and auditory changes, epistaxis, upper respiratory symptoms and dysphagia  CV: Pt denies CP, SOB, GEIGER, orthopnea palpitations, LE swelling, and claudication. PULM: Pt denies cough, wheezing or sputum production  GI: Pt denies N/V/D, heart burn, rectal bleeding, or melena. NEURO: Pt denies unilateral weakness, paresthesia and dizziness. OBJECTIVE:  Vitals:    01/28/20 0759 01/28/20 0825   BP: (!) 148/86 122/80   Pulse: 76    Weight: 206 lb (93.4 kg)      Body mass index is 35.36 kg/m². Wt Readings from Last 3 Encounters:   01/28/20 206 lb (93.4 kg)   01/02/20 206 lb (93.4 kg)   05/09/19 198 lb (89.8 kg)     BP Readings from Last 3 Encounters:   01/28/20 122/80   01/07/20 (!) 136/90   01/02/20 130/80        GEN: NAD, A&O, Non-toxic  HEENT: NC/AT, RAMOS, EOMI, Oral cavity Clear,  TM's NL, Nasal cavity clear. NECK: Supple. No thyromegaly. LYMPH: No C/SC nodes. CV: S1 S2 NL, RRR. No murmurs, clicks or rubs. PULM: CTA, symmentric air exchange  EXT: No edema. ASSESSMENT[de-identified]  Eugenia Leventhal was seen today for follow-up. Diagnoses and all orders for this visit:    Benign essential HTN        Additional Plan:  1. Lifestyle modifications--diet and exercise  2. Stress Modification  3.  No medication at this time      The 10-year ASCVD risk score (Chanel Lundberg, et al., 2013) is: 3.5%    Values used to calculate the score:      Age: 63 years      Sex: Female      Is Non- : No      Diabetic: No      Tobacco smoker: No      Systolic Blood Pressure: 741 mmHg      Is BP treated: No      HDL Cholesterol: 47 mg/dL      Total Cholesterol: 263 mg/dL       Discussed medications with patient who voiced understanding of their use, indication and potential side effects. Pt also understands the above recommendations. All questions answered. This note was generated completely or in part utilizing Dragon dictation speech recognition software. Occasionally, words are mistranscribed and despite editing, the text may contain inaccuracies due to incorrect word recognition.   If further clarification is needed please contact the office at [phone]

## 2020-02-25 ENCOUNTER — HOSPITAL ENCOUNTER (EMERGENCY)
Age: 60
Discharge: HOME | End: 2020-02-25
Attending: EMERGENCY MEDICINE
Payer: COMMERCIAL

## 2020-02-25 ENCOUNTER — APPOINTMENT (OUTPATIENT)
Dept: CT IMAGING | Age: 60
End: 2020-02-25
Payer: COMMERCIAL

## 2020-02-25 VITALS
TEMPERATURE: 99.4 F | RESPIRATION RATE: 17 BRPM | OXYGEN SATURATION: 87 % | DIASTOLIC BLOOD PRESSURE: 51 MMHG | SYSTOLIC BLOOD PRESSURE: 108 MMHG | HEART RATE: 100 BPM

## 2020-02-25 LAB
A/G RATIO: 1.3 (ref 1.1–2.2)
ALBUMIN SERPL-MCNC: 4.4 G/DL (ref 3.4–5)
ALP BLD-CCNC: 112 U/L (ref 40–129)
ALT SERPL-CCNC: 21 U/L (ref 10–40)
ANION GAP SERPL CALCULATED.3IONS-SCNC: 12 MMOL/L (ref 3–16)
AST SERPL-CCNC: 17 U/L (ref 15–37)
BASOPHILS ABSOLUTE: 0 K/UL (ref 0–0.2)
BASOPHILS RELATIVE PERCENT: 0.3 %
BILIRUB SERPL-MCNC: 0.3 MG/DL (ref 0–1)
BUN BLDV-MCNC: 15 MG/DL (ref 7–20)
CALCIUM SERPL-MCNC: 9.4 MG/DL (ref 8.3–10.6)
CHLORIDE BLD-SCNC: 102 MMOL/L (ref 99–110)
CO2: 26 MMOL/L (ref 21–32)
CREAT SERPL-MCNC: 0.7 MG/DL (ref 0.6–1.1)
EOSINOPHILS ABSOLUTE: 0.1 K/UL (ref 0–0.6)
EOSINOPHILS RELATIVE PERCENT: 0.7 %
GFR AFRICAN AMERICAN: >60
GFR NON-AFRICAN AMERICAN: >60
GLOBULIN: 3.4 G/DL
GLUCOSE BLD-MCNC: 138 MG/DL (ref 70–99)
HCT VFR BLD CALC: 42.3 % (ref 36–48)
HEMOGLOBIN: 14.4 G/DL (ref 12–16)
LIPASE: 14 U/L (ref 13–60)
LYMPHOCYTES ABSOLUTE: 1.4 K/UL (ref 1–5.1)
LYMPHOCYTES RELATIVE PERCENT: 13.5 %
MCH RBC QN AUTO: 30.5 PG (ref 26–34)
MCHC RBC AUTO-ENTMCNC: 34 G/DL (ref 31–36)
MCV RBC AUTO: 89.9 FL (ref 80–100)
MONOCYTES ABSOLUTE: 0.6 K/UL (ref 0–1.3)
MONOCYTES RELATIVE PERCENT: 6 %
NEUTROPHILS ABSOLUTE: 8.5 K/UL (ref 1.7–7.7)
NEUTROPHILS RELATIVE PERCENT: 79.5 %
PDW BLD-RTO: 13 % (ref 12.4–15.4)
PLATELET # BLD: 255 K/UL (ref 135–450)
PMV BLD AUTO: 7.3 FL (ref 5–10.5)
POTASSIUM SERPL-SCNC: 3.8 MMOL/L (ref 3.5–5.1)
RBC # BLD: 4.7 M/UL (ref 4–5.2)
SODIUM BLD-SCNC: 140 MMOL/L (ref 136–145)
TOTAL PROTEIN: 7.8 G/DL (ref 6.4–8.2)
WBC # BLD: 10.6 K/UL (ref 4–11)

## 2020-02-25 PROCEDURE — 96375 TX/PRO/DX INJ NEW DRUG ADDON: CPT

## 2020-02-25 PROCEDURE — 83690 ASSAY OF LIPASE: CPT

## 2020-02-25 PROCEDURE — 96374 THER/PROPH/DIAG INJ IV PUSH: CPT

## 2020-02-25 PROCEDURE — 74177 CT ABD & PELVIS W/CONTRAST: CPT

## 2020-02-25 PROCEDURE — 6360000002 HC RX W HCPCS: Performed by: EMERGENCY MEDICINE

## 2020-02-25 PROCEDURE — 6360000004 HC RX CONTRAST MEDICATION: Performed by: EMERGENCY MEDICINE

## 2020-02-25 PROCEDURE — 99284 EMERGENCY DEPT VISIT MOD MDM: CPT

## 2020-02-25 PROCEDURE — 2500000003 HC RX 250 WO HCPCS: Performed by: EMERGENCY MEDICINE

## 2020-02-25 PROCEDURE — 80053 COMPREHEN METABOLIC PANEL: CPT

## 2020-02-25 PROCEDURE — 85025 COMPLETE CBC W/AUTO DIFF WBC: CPT

## 2020-02-25 RX ORDER — HYDROCODONE BITARTRATE AND ACETAMINOPHEN 5; 325 MG/1; MG/1
1 TABLET ORAL EVERY 4 HOURS PRN
Qty: 18 TABLET | Refills: 0 | Status: SHIPPED | OUTPATIENT
Start: 2020-02-25 | End: 2020-02-28

## 2020-02-25 RX ORDER — AMOXICILLIN AND CLAVULANATE POTASSIUM 875; 125 MG/1; MG/1
1 TABLET, FILM COATED ORAL 2 TIMES DAILY
Qty: 20 TABLET | Refills: 0 | Status: SHIPPED | OUTPATIENT
Start: 2020-02-25 | End: 2020-03-06

## 2020-02-25 RX ORDER — ONDANSETRON 4 MG/1
4 TABLET, FILM COATED ORAL DAILY PRN
Qty: 30 TABLET | Refills: 0 | Status: SHIPPED | OUTPATIENT
Start: 2020-02-25 | End: 2020-03-03 | Stop reason: ALTCHOICE

## 2020-02-25 RX ORDER — ONDANSETRON 2 MG/ML
4 INJECTION INTRAMUSCULAR; INTRAVENOUS ONCE
Status: COMPLETED | OUTPATIENT
Start: 2020-02-25 | End: 2020-02-25

## 2020-02-25 RX ORDER — HYDROMORPHONE HYDROCHLORIDE 1 MG/ML
1 INJECTION, SOLUTION INTRAMUSCULAR; INTRAVENOUS; SUBCUTANEOUS ONCE
Status: COMPLETED | OUTPATIENT
Start: 2020-02-25 | End: 2020-02-25

## 2020-02-25 RX ADMIN — HYDROMORPHONE HYDROCHLORIDE 1 MG: 1 INJECTION, SOLUTION INTRAMUSCULAR; INTRAVENOUS; SUBCUTANEOUS at 21:48

## 2020-02-25 RX ADMIN — IOPAMIDOL 75 ML: 755 INJECTION, SOLUTION INTRAVENOUS at 22:04

## 2020-02-25 RX ADMIN — ONDANSETRON 4 MG: 2 INJECTION INTRAMUSCULAR; INTRAVENOUS at 21:48

## 2020-02-25 ASSESSMENT — PAIN SCALES - GENERAL
PAINLEVEL_OUTOF10: 9
PAINLEVEL_OUTOF10: 9

## 2020-02-26 NOTE — ED NOTES
Bed: 19  Expected date:   Expected time:   Means of arrival:   Comments:  [facility]  02/25/20 8211

## 2020-02-26 NOTE — ED PROVIDER NOTES
Emergency Department Encounter    Patient: Fidel Arredondo  MRN: [id number]  : 1960  Date of Evaluation: 2020  ED Provider:  Bulmaro Banks    Triage Chief Complaint:   Abdominal Pain (pt with abdominal pain that started last night and also pain in back that started today. +nausea. denies vomiting or diarrhea. )    [place]:  Fidel Arredondo is a 63 y.o. female that presents ER for evaluation of diffuse abdominal pain right lower quadrant left lower quadrant suprapubic. Patient had a prior history of D&C no other abdominal surgeries. Diffuse bilateral lower quadrant pain. Positive nausea without vomiting. No active chest pain pressure acute shortness breath. No rectal bleeding. No hematuria. Afebrile. No home therapy. Radiation as described. Diffuse cramping pain.     ROS - see HPI, below listed is current ROS at time of my eval:  General:  No fevers, no chills, no weakness  Eyes:  no discharge  ENT:  No sore throat, no nasal congestion  Cardiovascular:  No chest pain, no palpitations  Respiratory:  No shortness of breath, no cough, no wheezing  Gastrointestinal:  + pain, + nausea, no vomiting, no diarrhea  Musculoskeletal:  No muscle pain, no joint pain  Skin:  No rash, no pruritis  Neurologic:  no headache  Genitourinary:  No dysuria, no hematuria  Endocrine:  No unexpected weight gain, no unexpected weight loss  Extremities:  no edema, no pain    Past Medical History:   Diagnosis Date    Abnormal Pap smear of cervix     Acquired hypothyroidism 3/20/2017    Anesthesia complication     \" doesn`t take much \"     DDD (degenerative disc disease), lumbar 2018    DJD (degenerative joint disease), lumbar     Hormone disorder     Low grade squamous intraepith lesion on cytologic smear vagina (lgsil) 2014    Post-menopausal bleeding 2018    Prolonged emergence from general anesthesia     Sigmoid diverticulosis     Steroid-induced psychosis      Past Surgical History: Procedure Laterality Date    BREAST BIOPSY  2015    COLONOSCOPY      DILATION AND CURETTAGE OF UTERUS N/A 2019    HYSTEROSCOPY, DILATION AND CURETTAGE performed by Reginald Orona MD at [address] [place] [address],[address]       Family History   Problem Relation Age of Onset    Cancer Mother 59        Leukemia    Cancer Father 48        Melenoma    Rheum Arthritis Neg Hx     Osteoarthritis Neg Hx     Breast Cancer Neg Hx     Heart Failure Neg Hx     Hypertension Neg Hx     Migraines Neg Hx     Ovarian Cancer Neg Hx     Rashes/Skin Problems Neg Hx     Seizures Neg Hx     Thyroid Disease Neg Hx      Social History     Socioeconomic History    Marital status:      Spouse name: Not on file    Number of children: Not on file    Years of education: Not on file    Highest education level: Not on file   Occupational History    Not on file   Social Needs    Financial resource strain: Not on file    Food insecurity:     Worry: Not on file     Inability: Not on file    Transportation needs:     Medical: Not on file     Non-medical: Not on file   Tobacco Use    Smoking status: Former Smoker     Packs/day: 1.00     Years: 10.00     Pack years: 10.00     Last attempt to quit: 1996     Years since quittin.5    Smokeless tobacco: Never Used   Substance and Sexual Activity    Alcohol use: No     Alcohol/week: 0.0 standard drinks    Drug use: No     Comment: Zaida [employer] in college    Sexual activity: Yes     Partners: Male     Comment:    Lifestyle    Physical activity:     Days per week: Not on file     Minutes per session: Not on file    Stress: Not on file   Relationships    Social connections:     Talks on phone: Not on file     Gets together: Not on file     Attends Scientologist service: Not on file     Active member of club or organization: Not on file     Attends meetings of clubs or organizations: Not on file     Relationship status: Not on file    Intimate partner violence:     Fear of current or ex partner: Not on file     Emotionally abused: Not on file     Physically abused: Not on file     Forced sexual activity: Not on file   Other Topics Concern    Not on file   Social History Narrative    Not on file     No current facility-administered medications for this encounter. Current Outpatient Medications   Medication Sig Dispense Refill    amoxicillin-clavulanate (AUGMENTIN) 875-125 MG per tablet Take 1 tablet by mouth 2 times daily for 10 days 20 tablet 0    ondansetron (ZOFRAN) 4 MG tablet Take 1 tablet by mouth daily as needed for Nausea or Vomiting 30 tablet 0    HYDROcodone-acetaminophen (NORCO) 5-325 MG per tablet Take 1 tablet by mouth every 4 hours as needed for Pain for up to 3 days. Intended supply: 3 days. Take lowest dose possible to manage pain 18 tablet 0    levothyroxine (LEVOTHROID) 50 MCG tablet Take 1 tablet by mouth daily 90 tablet 1    triamcinolone (KENALOG) 0.1 % cream Apply topically 2 times daily for 10-14 days.  60 g 0    tiZANidine (ZANAFLEX) 4 MG tablet Take 0.5-1 tablets by mouth nightly 30 tablet 0    ibuprofen (ADVIL;MOTRIN) 800 MG tablet Take 1 tablet by mouth nightly as needed 30 tablet 1    Tens Unit MISC by Does not apply route Apply daily as needed 1 each 0     Allergies   Allergen Reactions    Lorazepam Other (See Comments)     Paradoxical reaction    Prednisone Other (See Comments)     Severe steroid psychosis \" can tolerate at loses for short periods\"       Nursing Notes Reviewed    Physical Exam:  Triage VS:    ED Triage Vitals   Enc Vitals Group      BP 02/25/20 1948 128/76      Pulse 02/25/20 1947 100      Resp 02/25/20 1947 17      Temp 02/25/20 1947 99.4 °F (37.4 °C)      Temp Source 02/25/20 1947 Temporal      SpO2 02/25/20 1947 98 %      Weight --       Height --       Head Circumference --       Peak Flow --       Pain Score --       Pain Loc --       Pain Edu? -- Albumin/Globulin Ratio 1.3 1.1 - 2.2    Total Bilirubin 0.3 0.0 - 1.0 mg/dL    Alkaline Phosphatase 112 40 - 129 U/L    ALT 21 10 - 40 U/L    AST 17 15 - 37 U/L    Globulin 3.4 g/dL   Lipase   Result Value Ref Range    Lipase 14.0 13.0 - 60.0 U/L      Radiographs (if obtained):  Radiologist's Report Reviewed:  No results found. EKG (if obtained): (All EKG's are interpreted by myself in the absence of a cardiologist)      MDM:  Patient here with abdominal pain. I estimate there is LOW risk for an acute emergent intraabdominal process including, but not limited to, acute appendicitis, bowel obstruction, acute cholecystitis, ruptured diverticulitis, incarcerated/strangling hernia,  perforated bowel/ulcer. Workup reveals to the ER for evaluation of uncomplicated sigmoid diverticulitis. She is no evidence for appendicitis, no abscess. Outpatient follow-up with general surgery, oral Augmentin analgesia and return if worse or new symptoms    Patient's abdominal exam in the ED is nonsurgical.  I do feel the Patient can be discharged home. I have discussed the results, plan for treatment and possible risks, and patient agrees with discharging home with close follow-up. Patient understands and agrees with the plan, return warnings given. We have discussed the symptoms which are most concerning that necessitate immediate return. Clinical Impression:  1.  Diverticulitis of colon      Disposition referral (if applicable):  Juan Tamayo [address]   [address]  [phone]          Disposition medications (if applicable):  Discharge Medication List as of 2/25/2020 11:22 PM      START taking these medications    Details   amoxicillin-clavulanate (AUGMENTIN) 875-125 MG per tablet Take 1 tablet by mouth 2 times daily for 10 days, Disp-20 tablet, R-0Print      ondansetron (ZOFRAN) 4 MG tablet Take 1 tablet by mouth daily as needed for Nausea or Vomiting, Disp-30 tablet, R-0Print      HYDROcodone-acetaminophen (NORCO) 5-325 MG per tablet Take 1 tablet by mouth every 4 hours as needed for Pain for up to 3 days. Intended supply: 3 days. Take lowest dose possible to manage pain, Disp-18 tablet, R-0Print             Comment: Please note this report has been produced using speech recognition software and may contain errors related to that system including errors in grammar, punctuation, and spelling, as well as words and phrases that may be inappropriate. Efforts were made to edit the dictations.      Lenin Oseguera MD  70/89/00 0478

## 2020-03-03 ENCOUNTER — OFFICE VISIT (OUTPATIENT)
Dept: INTERNAL MEDICINE CLINIC | Age: 60
End: 2020-03-03
Payer: COMMERCIAL

## 2020-03-03 VITALS
HEART RATE: 74 BPM | DIASTOLIC BLOOD PRESSURE: 80 MMHG | RESPIRATION RATE: 12 BRPM | SYSTOLIC BLOOD PRESSURE: 130 MMHG | BODY MASS INDEX: 33.99 KG/M2 | WEIGHT: 198 LBS

## 2020-03-03 PROCEDURE — 99214 OFFICE O/P EST MOD 30 MIN: CPT | Performed by: INTERNAL MEDICINE

## 2020-03-03 PROCEDURE — 3017F COLORECTAL CA SCREEN DOC REV: CPT | Performed by: INTERNAL MEDICINE

## 2020-03-03 PROCEDURE — 1036F TOBACCO NON-USER: CPT | Performed by: INTERNAL MEDICINE

## 2020-03-03 PROCEDURE — G8484 FLU IMMUNIZE NO ADMIN: HCPCS | Performed by: INTERNAL MEDICINE

## 2020-03-03 PROCEDURE — 81002 URINALYSIS NONAUTO W/O SCOPE: CPT | Performed by: INTERNAL MEDICINE

## 2020-03-03 PROCEDURE — G8428 CUR MEDS NOT DOCUMENT: HCPCS | Performed by: INTERNAL MEDICINE

## 2020-03-03 PROCEDURE — G8417 CALC BMI ABV UP PARAM F/U: HCPCS | Performed by: INTERNAL MEDICINE

## 2020-03-03 PROCEDURE — 93000 ELECTROCARDIOGRAM COMPLETE: CPT | Performed by: INTERNAL MEDICINE

## 2020-03-03 RX ORDER — FAMOTIDINE 20 MG/1
20 TABLET, FILM COATED ORAL 2 TIMES DAILY
Qty: 60 TABLET | Refills: 3 | Status: SHIPPED
Start: 2020-03-03 | End: 2021-01-15

## 2020-03-03 RX ORDER — SACCHAROMYCES BOULARDII 250 MG
250 CAPSULE ORAL 2 TIMES DAILY
Qty: 60 CAPSULE | Refills: 0 | Status: SHIPPED | OUTPATIENT
Start: 2020-03-03 | End: 2020-04-02

## 2020-03-03 RX ORDER — HYDROCODONE BITARTRATE AND ACETAMINOPHEN 5; 325 MG/1; MG/1
1 TABLET ORAL EVERY 6 HOURS PRN
COMMUNITY
End: 2021-01-15 | Stop reason: ALTCHOICE

## 2020-03-03 NOTE — PATIENT INSTRUCTIONS
Push clear liquids. Avoid food at this time to allow for bowel rest.      Proceed to the emergency room should your symptoms worsen, persist or if new symptoms develop such as fever, increased pain    You will need a stress test but will wait until your diverticulitis is resolved      Patient Education        Chest Pain: Care Instructions  Your Care Instructions    There are many things that can cause chest pain. Some are not serious and will get better on their own in a few days. But some kinds of chest pain need more testing and treatment. Your doctor may have recommended a follow-up visit in the next 8 to 12 hours. If you are not getting better, you may need more tests or treatment. Even though your doctor has released you, you still need to watch for any problems. The doctor carefully checked you, but sometimes problems can develop later. If you have new symptoms or if your symptoms do not get better, get medical care right away. If you have worse or different chest pain or pressure that lasts more than 5 minutes or you passed out (lost consciousness), call 911 or seek other emergency help right away. A medical visit is only one step in your treatment. Even if you feel better, you still need to do what your doctor recommends, such as going to all suggested follow-up appointments and taking medicines exactly as directed. This will help you recover and help prevent future problems. How can you care for yourself at home? · Rest until you feel better. · Take your medicine exactly as prescribed. Call your doctor if you think you are having a problem with your medicine. · Do not drive after taking a prescription pain medicine. When should you call for help? Call 911 if:    · You passed out (lost consciousness).     · You have severe difficulty breathing.     · You have symptoms of a heart attack. These may include:  ? Chest pain or pressure, or a strange feeling in your chest.  ? Sweating. ?  Shortness of instructions on the label. ? Schedule time each day for a bowel movement. Having a daily routine may help. Take your time and do not strain when having a bowel movement. When should you call for help? Call your doctor now or seek immediate medical care if:    · You have a fever.     · You are vomiting.     · You have new or worse belly pain.     · You cannot pass stools or gas.    Watch closely for changes in your health, and be sure to contact your doctor if you have any problems. Where can you learn more? Go to https://Web [employer] Inc.pehaydentvCompasseb.Core Informatics. org and sign in to your fastDove account. Enter H901 in the [employer] box to learn more about \"Diverticulitis: Care Instructions. \"     If you do not have an account, please click on the \"Sign Up Now\" link. Current as of: August 11, 2019  Content Version: 12.3  © 2780-3958 [employer]. Care instructions adapted under license by [facility] [place] ([facility]). If you have questions about a medical condition or this instruction, always ask your healthcare professional. [place] [address] any warranty or liability for your use of this information. Patient Education        Painful Urination (Dysuria): Care Instructions  Your Care Instructions  Burning pain with urination (dysuria) is a common symptom of a urinary tract infection or other urinary problems. The bladder may become inflamed. This can cause pain when the bladder fills and empties. You may also feel pain if the tube that carries urine from the bladder to the outside of the body (urethra) gets irritated or infected. Sexually transmitted infections (STIs) also may cause pain when you urinate. Sometimes the pain can be caused by things other than an infection. The urethra can be irritated by soaps, perfumes, or foreign objects in the urethra. Kidney stones can cause pain when they pass through the urethra. The cause may be hard to find. You may need tests.  Treatment for questions about a medical condition or this instruction, always ask your healthcare professional. [address] any warranty or liability for your use of this information. Patient Education        Gastroesophageal Reflux Disease (GERD): Care Instructions  Your Care Instructions    Gastroesophageal reflux disease (GERD) is the backward flow of stomach acid into the esophagus. The esophagus is the tube that leads from your throat to your stomach. A one-way valve prevents the stomach acid from moving up into this tube. When you have GERD, this valve does not close tightly enough. If you have mild GERD symptoms including heartburn, you may be able to control the problem with antacids or over-the-counter medicine. Changing your diet, losing weight, and making other lifestyle changes can also help reduce symptoms. Follow-up care is a key part of your treatment and safety. Be sure to make and go to all appointments, and call your doctor if you are having problems. It's also a good idea to know your test results and keep a list of the medicines you take. How can you care for yourself at home? · Take your medicines exactly as prescribed. Call your doctor if you think you are having a problem with your medicine. · Your doctor may recommend over-the-counter medicine. For mild or occasional indigestion, antacids, such as Tums, Gaviscon, Mylanta, or Maalox, may help. Your doctor also may recommend over-the-counter acid reducers, such as Pepcid AC, Tagamet HB, Zantac 75, or Prilosec. Read and follow all instructions on the label. If you use these medicines often, talk with your doctor. · Change your eating habits. ? It's best to eat several small meals instead of two or three large meals. ? After you eat, wait 2 to 3 hours before you lie down. ? Chocolate, mint, and alcohol can make GERD worse. ?  Spicy foods, foods that have a lot of acid (like tomatoes and oranges), and coffee can make GERD

## 2020-03-03 NOTE — PROGRESS NOTES
[facility]) Physicians  Internal Medicine  Patient Encounter  Shawn Garsia D.O., [facility]        Chief Complaint   Patient presents with   [facility] ED Follow-up       HPI: 63 y.o. female seen today status post ER visit on 2/25/2020. At that time she presented with lower abdominal pain. She underwent a CAT scan which revealed acute, non-complicated diverticulitis. There is no evidence of free air or abscess formation. Patient has been tracking her blood pressure at home - ranging from elevated to stage 2 hypertension. States that when she would have normal blood pressure over the weekend and when she would sit in her hot tub. States that she also started to walk more and was trying to loose weight. At this time she was seeing more normal blood pressure readings. Now she believes that the back pain that she was having due to the pressure of the diverticulitis was causing her blood pressure to be elevated. Patient was in the ED due to abdominal and back pain. She was discharged from the ED with Augmentin and oxycodone. Patient states that she will wait until she is in \"extreme pain\" and will take half a pill. The maximum amount that she has taken has been 1.5 pills in a day. Patient has been taking Metamucil with some relief. Denies any decrease in the pain since being discharged from the ED. Ranks her pain as a 3-7/10. Describes the pain as cramping in her lower abdomin but with the majority of the pain being located in her lateral hips, low back and thoracic segments. States that the oxycodone and heating pads helps to alleviate the pain. Denies taking anything else for the pain. Aggravating symptoms include walking and standing for long periods of time. Patient complains of nausea for the last 2 months whenever she bends over. States that she vomited after being discharged from the ED. She believes that this is due to the medication that she was given while in the ED.  Complains of chills for the last 2-3 : No      Diabetic: No      Tobacco smoker: No      Systolic Blood Pressure: 831 mmHg      Is BP treated: No      HDL Cholesterol: 47 mg/dL      Total Cholesterol: 263 mg/dL         ASSESSMENT[de-identified]  Cece Quach was seen today for ed follow-up. Diagnoses and all orders for this visit:    Diverticulitis  -     saccharomyces boulardii (FLORASTOR) 250 MG capsule; Take 1 capsule by mouth 2 times daily  -     CBC Auto Differential; Future  -     Basic Metabolic Panel; Future  -     Sedimentation Rate; Future  -     C-Reactive Protein; Future    Dysuria  -     POCT Urinalysis no Micro    Chest pain, unspecified type  -     Patient will have a stress test but will wait until the diverticulitis has abated. Patient will get a plain GXT. -     famotidine (PEPCID) 20 MG tablet; Take 1 tablet by mouth 2 times daily  -     EKG 12 Lead    Gastroesophageal reflux disease without esophagitis  -     famotidine (PEPCID) 20 MG tablet; Take 1 tablet by mouth 2 times daily        Additional Plan:  1. Bowel rest.  In a week she can transition to a low residue diet  2. Patient will finish Augmentin on Friday. If symptoms do not suzanne, try Flagyl and Cipro  3. Push clear liquids. 4.  Start Pepcid 20 mg BID  5. Okay to see the surgeon for second opinion  6. Patient should undergo a stress test but will wait until the diverticulitis is resolved. 7.  Advised to go to the emergency room should her symptoms worsen, persist or if new symptoms develop such as fever. Medical student/resident notes reviewed and agreed. Pt was was independently interviewed and examined by myself. Changes made to note when needed. Italicized notation indicates student/resident documentation. Discussed medications with patient who voiced understanding of their use, indication and potential side effects. Pt also understands the above recommendations. All questions answered.     This note was generated completely or in part utilizing

## 2020-08-13 RX ORDER — LEVOTHYROXINE SODIUM 0.05 MG/1
50 TABLET ORAL DAILY
Qty: 90 TABLET | Refills: 1 | Status: SHIPPED
Start: 2020-08-13 | End: 2021-01-27 | Stop reason: DRUGHIGH

## 2020-08-13 NOTE — TELEPHONE ENCOUNTER
Last appointment: 3/3/2020  Next appointment: Visit date not found  Last refill: 01/27/2020 # 80 with one refill

## 2021-01-15 ENCOUNTER — OFFICE VISIT (OUTPATIENT)
Dept: INTERNAL MEDICINE CLINIC | Age: 61
End: 2021-01-15
Payer: COMMERCIAL

## 2021-01-15 VITALS
HEIGHT: 64 IN | WEIGHT: 201.8 LBS | SYSTOLIC BLOOD PRESSURE: 134 MMHG | DIASTOLIC BLOOD PRESSURE: 70 MMHG | RESPIRATION RATE: 14 BRPM | BODY MASS INDEX: 34.45 KG/M2 | OXYGEN SATURATION: 97 % | HEART RATE: 71 BPM | TEMPERATURE: 97.6 F

## 2021-01-15 DIAGNOSIS — E03.9 ACQUIRED HYPOTHYROIDISM: Primary | ICD-10-CM

## 2021-01-15 DIAGNOSIS — E78.2 MIXED HYPERLIPIDEMIA: ICD-10-CM

## 2021-01-15 DIAGNOSIS — K76.0 HEPATIC STEATOSIS: ICD-10-CM

## 2021-01-15 DIAGNOSIS — Z23 FLU VACCINE NEED: ICD-10-CM

## 2021-01-15 PROCEDURE — G8417 CALC BMI ABV UP PARAM F/U: HCPCS | Performed by: INTERNAL MEDICINE

## 2021-01-15 PROCEDURE — 99213 OFFICE O/P EST LOW 20 MIN: CPT | Performed by: INTERNAL MEDICINE

## 2021-01-15 PROCEDURE — G8427 DOCREV CUR MEDS BY ELIG CLIN: HCPCS | Performed by: INTERNAL MEDICINE

## 2021-01-15 PROCEDURE — 1036F TOBACCO NON-USER: CPT | Performed by: INTERNAL MEDICINE

## 2021-01-15 PROCEDURE — 90471 IMMUNIZATION ADMIN: CPT | Performed by: INTERNAL MEDICINE

## 2021-01-15 PROCEDURE — G8482 FLU IMMUNIZE ORDER/ADMIN: HCPCS | Performed by: INTERNAL MEDICINE

## 2021-01-15 PROCEDURE — 90686 IIV4 VACC NO PRSV 0.5 ML IM: CPT | Performed by: INTERNAL MEDICINE

## 2021-01-15 PROCEDURE — 3017F COLORECTAL CA SCREEN DOC REV: CPT | Performed by: INTERNAL MEDICINE

## 2021-01-15 NOTE — PROGRESS NOTES
Vaccine Information Sheet, \"Influenza - Inactivated\"  given to Erwin Mcmillan, or parent/legal guardian of  Erwin Mcmillan and verbalized understanding. Patient responses:    Have you ever had a reaction to a flu vaccine? No  Do you have any current illness? No  Have you ever had Guillian [place] Syndrome? No  Do you have a serious allergy to any of the follow: Neomycin, Polymyxin, Thimerosal, eggs or egg products? No    Flu vaccine given per order. Please see immunization tab. Risks and benefits explained. Current VIS given.       Immunizations Administered     Name Date Dose Route    Influenza, Quadv, IM, PF (6 mo and older Fluzone, Flulaval, Fluarix, and 3 yrs and older Afluria) 1/15/2021 0.5 mL Intramuscular    Site: Deltoid- Left    Lot: [id number]    NDC: 50843-185-43

## 2021-01-15 NOTE — PROGRESS NOTES
[facility]) Physicians  Internal Medicine  Patient Encounter  Bam Tong D.O., [facility]        Chief Complaint   Patient presents with    Follow-up       HPI: 62 y.o. female seen today requesting a routine checkup regarding the status of current chronic medical problems as the below along with a medication review and reconciliation. Patient has a known history of hypothyroidism. Her last TSH was over a year ago. Her TSH at that time was 3.26. She denies any problems with constipation or diarrhea, heat or cold intolerance, weight loss or weight gain. She denies any skin or hair changes. She is compliant with her levothyroxine. She is on 50 mcg daily. At [facility] time, she had bloating, belching and \"acid\" after eating. She may get nauseated after eating. Greasy fatty foods makes it worse. She took pepto with release. She denies pain now. UTD on her colonoscopy. She has made some dietary changes and she is doing better. Patient also has a history of mixed dyslipidemia. Her lab work a year ago showed a total cholesterol 263 with a triglyceride level 296 and LDL level of 157. Her HDL was 47. Past Medical History:   Diagnosis Date    Abnormal Pap smear of cervix     Acquired hypothyroidism 03/20/2017    Anesthesia complication     \" doesn`t take much \"     DDD (degenerative disc disease), lumbar 02/2018    Diverticulitis 02/25/2020    DJD (degenerative joint disease), lumbar     Fatty liver 02/25/2020    Hormone disorder     Loose body joint-ankle/foot, right 09/2020    [facility]    Low grade squamous intraepith lesion on cytologic smear vagina (lgsil) 11/2014    Post-menopausal bleeding 03/21/2018    Prolonged emergence from general anesthesia     Sigmoid diverticulosis     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  Authorizing Provider   levothyroxine (LEVOTHROID) 50 MCG tablet Take 1 tablet by mouth daily Yes Jonatan Garcia DO           Review of Systems - As per HPI  GEN: Pt denies fever, chills or night sweats. Denies weight changes. Appetite good  HEENT: Pt denies visual and auditory changes, epistaxis, upper respiratory symptoms and dysphagia  CV: Pt denies CP, SOB, GEIGER, orthopnea palpitations, LE swelling, and claudication. PULM: Pt denies cough, wheezing or sputum production  GI: Pt denies N/V/D, heart burn, rectal bleeding, or melena. NEURO: Pt denies unilateral weakness, paresthesia and dizziness. OBJECTIVE:  Vitals:    01/15/21 1435 01/15/21 1503   BP: 138/80 134/70   Pulse: 71    Resp: 14    Temp: 97.6 °F (36.4 °C)    TempSrc: Temporal    SpO2: 97%    Weight: 201 lb 12.8 oz (91.5 kg)    Height: 5' 4\" (1.626 m)      Body mass index is 34.64 kg/m². Wt Readings from Last 3 Encounters:   01/15/21 201 lb 12.8 oz (91.5 kg)   03/03/20 198 lb (89.8 kg)   01/28/20 206 lb (93.4 kg)     BP Readings from Last 3 Encounters:   01/15/21 134/70   03/03/20 130/80   02/25/20 (!) 108/51      GEN: NAD, A&O, Non-toxic  HEENT: NC/AT, RAMOS, EOMI, Oral cavity Clear,  TM's NL, Nasal cavity clear. NECK: Supple. No thyromegaly. No JVD  LYMPH: No C/SC nodes. CV: S1 S2 NL, RRR. No murmurs, clicks or rubs. PULM: CTA, symmentric air exchange  EXT: No C/C/E  GI: Abdomen is soft, NT, BS+, No hepatomegaly. No masses. NEURO: No focal or lateralizing deficits. VASC:  No carotid bruits. Pulses symmetric  SKIN:  No rashes or lesions of concern    ASSESSMENT[de-identified]  John Galan was seen today for follow-up. Diagnoses and all orders for this visit:    Acquired hypothyroidism  --Condition stability is uncertain  --Check TSH  --Continue current levothyroxine dose for now  -     Comprehensive Metabolic Panel; Future  -     TSH without Reflex;  Future    Flu vaccine need  -     INFLUENZA, QUADV, 3 YRS AND OLDER, IM PF, PREFILL SYR OR SDV, 0.5ML (AFLURIA QUADV, PF)    Hepatic steatosis  --Continue to focus on a low-fat, low-carb diet  --Recheck LFTs  -     Comprehensive Metabolic Panel; Future    Mixed hyperlipidemia  -     Lipid Panel; Future  -     Comprehensive Metabolic Panel; Future        Additional Plan:  1. Advised patient to call should GI symptoms return. Counseled on signs and symptoms of gallbladder disease. May need gallbladder ultrasound. Literature provided. Discussed medications with patient who voiced understanding of their use, indication and potential side effects. Pt also understands the above recommendations. All questions answered. This note was generated completely or in part utilizing Dragon dictation speech recognition software. Occasionally, words are mistranscribed and despite editing, the text may contain inaccuracies due to incorrect word recognition.   If further clarification is needed please contact the office at [phone]       Electronically signed    Poly Haque D.O.

## 2021-01-15 NOTE — PATIENT INSTRUCTIONS
Return to the office sooner should you continue to develop stomach symptoms particularly after eating. This may represent gallbladder problems or recurrent diverticular problems. Patient Education        Nonalcoholic Steatohepatitis (ROY): Care Instructions  Your Care Instructions     Nonalcoholic steatohepatitis (ROY) is liver inflammation. It is caused by a buildup of fat in the liver. The fat buildup is not caused by drinking alcohol. Because of the inflammation, the liver does not work as well as it should. ROY is part of a group of liver diseases called nonalcoholic fatty liver disease. In these diseases, fat builds up in the liver and sometimes causes liver damage. This damage can get worse over time. Follow-up care is a key part of your treatment and safety. Be sure to make and go to all appointments, and call your doctor if you are having problems. It's also a good idea to know your test results and keep a list of the medicines you take. How can you care for yourself at home? · Stay at a healthy weight. Or if you need to, slowly get to a healthy weight. · Control your cholesterol. Talk to your doctor about ways to lower your cholesterol, if needed. You might try getting active, taking medicines, and making healthy changes to your diet. · Eat healthy foods. This includes fruits, vegetables, lean meats and dairy, and whole grains. · If you have diabetes, keep your blood sugar at your target level. · Get at least 30 minutes of exercise on most days of the week. Walking is a good choice. You also may want to do other activities, such as running, swimming, cycling, or playing tennis or team sports. · Limit alcohol, or do not drink. Alcohol can damage the liver and cause health problems. When should you call for help? Call 911 anytime you think you may need emergency care. For example, call if:    · You have trouble breathing.     · You vomit blood or what looks like coffee grounds.    Call your doctor now or seek immediate medical care if:    · You feel very sleepy or confused.     · You have new or worse belly pain.     · You have a fever.     · There is a new or increasing yellow tint to your skin or the whites of your eyes.     · You have any abnormal bleeding, such as:  ? Nosebleeds. ? Vaginal bleeding that is different (heavier, more frequent, at a different time of the month) than what you are used to.  ? Bloody or black stools, or rectal bleeding. ? Bloody or pink urine. Watch closely for changes in your health, and be sure to contact your doctor if:    · Your belly is getting bigger.     · You are gaining weight.     · You have any problems. Where can you learn more? Go to [email or web address]. org and sign in to your [employer] account. Enter K722 in the [employer] box to learn more about \"Nonalcoholic Steatohepatitis (ROY): Care Instructions. \"     If you do not have an account, please click on the \"Sign Up Now\" link. Current as of: April 15, 2020               Content Version: 12.6  © 3786-7823 [employer]. Care instructions adapted under license by [facility] ([facility]). If you have questions about a medical condition or this instruction, always ask your healthcare professional. [address] any warranty or liability for your use of this information.

## 2021-01-25 DIAGNOSIS — E78.2 MIXED HYPERLIPIDEMIA: ICD-10-CM

## 2021-01-25 DIAGNOSIS — E03.9 ACQUIRED HYPOTHYROIDISM: ICD-10-CM

## 2021-01-25 DIAGNOSIS — K76.0 HEPATIC STEATOSIS: ICD-10-CM

## 2021-01-25 LAB
A/G RATIO: 1.9 (ref 1.1–2.2)
ALBUMIN SERPL-MCNC: 4.7 G/DL (ref 3.4–5)
ALP BLD-CCNC: 109 U/L (ref 40–129)
ALT SERPL-CCNC: 24 U/L (ref 10–40)
ANION GAP SERPL CALCULATED.3IONS-SCNC: 13 MMOL/L (ref 3–16)
AST SERPL-CCNC: 21 U/L (ref 15–37)
BILIRUB SERPL-MCNC: 0.3 MG/DL (ref 0–1)
BUN BLDV-MCNC: 17 MG/DL (ref 7–20)
CALCIUM SERPL-MCNC: 9.7 MG/DL (ref 8.3–10.6)
CHLORIDE BLD-SCNC: 101 MMOL/L (ref 99–110)
CHOLESTEROL, TOTAL: 243 MG/DL (ref 0–199)
CO2: 29 MMOL/L (ref 21–32)
CREAT SERPL-MCNC: 1 MG/DL (ref 0.6–1.1)
GFR AFRICAN AMERICAN: >60
GFR NON-AFRICAN AMERICAN: 57
GLOBULIN: 2.5 G/DL
GLUCOSE BLD-MCNC: 92 MG/DL (ref 70–99)
HDLC SERPL-MCNC: 48 MG/DL (ref 40–60)
LDL CHOLESTEROL CALCULATED: 159 MG/DL
POTASSIUM SERPL-SCNC: 4.2 MMOL/L (ref 3.5–5.1)
SODIUM BLD-SCNC: 143 MMOL/L (ref 136–145)
TOTAL PROTEIN: 7.2 G/DL (ref 6.4–8.2)
TRIGL SERPL-MCNC: 180 MG/DL (ref 0–150)
TSH SERPL DL<=0.05 MIU/L-ACNC: 4.96 UIU/ML (ref 0.27–4.2)
VLDLC SERPL CALC-MCNC: 36 MG/DL

## 2021-01-27 DIAGNOSIS — E03.9 ACQUIRED HYPOTHYROIDISM: Primary | ICD-10-CM

## 2021-01-27 RX ORDER — LEVOTHYROXINE SODIUM 0.07 MG/1
75 TABLET ORAL DAILY
Qty: 90 TABLET | Refills: 1 | Status: SHIPPED | OUTPATIENT
Start: 2021-01-27 | End: 2021-09-08 | Stop reason: SDUPTHER

## 2021-07-19 ENCOUNTER — OFFICE VISIT (OUTPATIENT)
Dept: INTERNAL MEDICINE CLINIC | Age: 61
End: 2021-07-19
Payer: COMMERCIAL

## 2021-07-19 VITALS
SYSTOLIC BLOOD PRESSURE: 132 MMHG | OXYGEN SATURATION: 97 % | HEART RATE: 69 BPM | WEIGHT: 198 LBS | HEIGHT: 65 IN | DIASTOLIC BLOOD PRESSURE: 80 MMHG | BODY MASS INDEX: 32.99 KG/M2

## 2021-07-19 DIAGNOSIS — M54.50 CHRONIC BILATERAL LOW BACK PAIN WITHOUT SCIATICA: ICD-10-CM

## 2021-07-19 DIAGNOSIS — Z00.00 ANNUAL PHYSICAL EXAM: Primary | ICD-10-CM

## 2021-07-19 DIAGNOSIS — E03.9 ACQUIRED HYPOTHYROIDISM: ICD-10-CM

## 2021-07-19 DIAGNOSIS — Z13.1 SCREENING FOR DIABETES MELLITUS: ICD-10-CM

## 2021-07-19 DIAGNOSIS — E78.2 MIXED HYPERLIPIDEMIA: ICD-10-CM

## 2021-07-19 DIAGNOSIS — M47.816 SPONDYLOSIS OF LUMBAR REGION WITHOUT MYELOPATHY OR RADICULOPATHY: ICD-10-CM

## 2021-07-19 DIAGNOSIS — Z23 NEED FOR SHINGLES VACCINE: ICD-10-CM

## 2021-07-19 DIAGNOSIS — G89.29 CHRONIC BILATERAL LOW BACK PAIN WITHOUT SCIATICA: ICD-10-CM

## 2021-07-19 DIAGNOSIS — M51.36 DDD (DEGENERATIVE DISC DISEASE), LUMBAR: ICD-10-CM

## 2021-07-19 PROCEDURE — 90750 HZV VACC RECOMBINANT IM: CPT | Performed by: INTERNAL MEDICINE

## 2021-07-19 PROCEDURE — 90471 IMMUNIZATION ADMIN: CPT | Performed by: INTERNAL MEDICINE

## 2021-07-19 PROCEDURE — 99396 PREV VISIT EST AGE 40-64: CPT | Performed by: INTERNAL MEDICINE

## 2021-07-19 ASSESSMENT — PATIENT HEALTH QUESTIONNAIRE - PHQ9
SUM OF ALL RESPONSES TO PHQ QUESTIONS 1-9: 0
SUM OF ALL RESPONSES TO PHQ QUESTIONS 1-9: 0
2. FEELING DOWN, DEPRESSED OR HOPELESS: 0
SUM OF ALL RESPONSES TO PHQ9 QUESTIONS 1 & 2: 0
1. LITTLE INTEREST OR PLEASURE IN DOING THINGS: 0
SUM OF ALL RESPONSES TO PHQ QUESTIONS 1-9: 0

## 2021-07-19 NOTE — PATIENT INSTRUCTIONS
Preventive plan of care for Pricila Cooper        7/19/2021           Preventive Measures Status       Recommendations for screening   Colon Cancer Screen   Last colonoscopy: 9/13/16 Test recommended every 10 years. Next colonoscopy due in 2026   Breast Cancer Screen  Last mammogram: 11/1/2019 Mammogram due every 2 years. Next mammogram due November 2021   Cervical Cancer Screen   Last PAP smear: 11/14/2017, Dr. Juan Carlos Jung. [facility]  4/26/2019 Test is due annually or as advised by your gynecologist.     Osteoporosis Screen   Last DXA scan: 6/6/2014, Normal Repeat test at age 73   Diabetes Screen  Glucose (mg/dL)   Date Value   01/25/2021 92    Test recommended and ordered   Cholesterol Screen  Lab Results   Component Value Date    CHOL 243 (H) 01/25/2021    Test recommended and ordered   HIV screening recommended for those ages 12-76 NO MATTER THE RISK FOR HIV--  9/1/2016 No need to repeat at this time   Hepatitis C screening recommended for those born between [facility]-- 9/1/2016 No need to repeat at this time   Aspirin for Cardiovascular Prevention   none  aspirin is not currently indicated due to your low 10-year atherosclerotic cardiovascular disease risk score    Recommended Immunizations    Immunization History   Administered Date(s) Administered    COVID-19, J&J, PF, 0.5 mL 04/12/2021    Influenza Vaccine, unspecified formulation 10/10/2017, 11/01/2018    Influenza Virus Vaccine 09/30/2009, 10/10/2017, 11/01/2018, 09/09/2019    Influenza, Quadv, IM, PF (6 mo and older Fluzone, Flulaval, Fluarix, and 3 yrs and older Afluria) 01/15/2021    Tdap (Boostrix, Adacel) 05/21/2014        Hep A- vaccination-- + Antibody titers 5/25/2014    Influenza vaccine:  recommended every fall    Pneumonia vaccine: Prevnar due at age 72    Tetanus vaccine:  tetanus and diptheria vaccine (Td/Tdap) recommended every 10 years- Td due in 2024. Shingles vaccine:  Shingrx is due         Additional Recommendations   1.   Use sunscreen daily to help reduce the risk of skin cancer  2. Continue a healthy lifestyle including a healthy diet and aerobic exercise  3. Update your eye exam every 2 years  4. Always wear a seatbelt while in a car  5. Always wear a helmet when riding a bike or motorcycle  6. See your dermatologist yearly for comprehensive of skin exam-- Dr. Heike Clements.  7. Please follow up with Dr. Alexx ESPINOSA. 8.  Shingrix No. 2 in 2 to 6 months              Here are a few  Reliable websites with a variety of health and wellness information:   www.mylifecheck. heart. org     www.nutritionsource. org     www. americanheart. org     www. diabetes. org      www.menopause. org     www.AzulloColorModules     www.[employer] ([address] [facility] site)          Patient Education        Learning About How to Have a Healthy Back  What causes back pain? Back pain is often caused by overuse, strain, or injury. For example, people often hurt their backs playing sports or working in the yard, being jolted in a car accident, or lifting something too heavy. Aging plays a part too. Your bones and muscles tend to lose strength as you age, which makes injury more likely. The spongy discs between the bones of the spine (vertebrae) may suffer from wear and tear and no longer provide enough cushion between the bones. A disc that bulges or breaks open (herniated disc) can press on nerves, causing back pain. In some people, back pain is the result of arthritis, broken vertebrae caused by bone loss (osteoporosis), illness, or a spine problem. Although most people have back pain at one time or another, there are steps you can take to make it less likely. How can you have a healthy back? Reduce stress on your back through good posture   Slumping or slouching alone may not cause low back pain. But after the back has been strained or injured, bad posture can make pain worse.   · Sleep in a position that maintains your back's normal curves and on a mattress that feels comfortable. Sleep on your side with a pillow between your knees, or sleep on your back with a pillow under your knees. These positions can reduce strain on your back. · Stand and sit up straight. \"Good posture\" generally means your ears, shoulders, and hips are in a straight line. · If you must stand for a long time, put one foot on a stool, ledge, or box. Switch feet every now and then. · Sit in a chair that is low enough to let you place both feet flat on the floor with both knees nearly level with your hips. If your chair or desk is too high, use a footrest to raise your knees. Place a small pillow, a rolled-up towel, or a lumbar roll in the curve of your back if you need extra support. · Try a kneeling chair, which helps tilt your hips forward. This takes pressure off your lower back. · Try sitting on an exercise ball. It can rock from side to side, which helps keep your back loose. · When driving, keep your knees nearly level with your hips. Sit straight, and drive with both hands on the steering wheel. Your arms should be in a slightly bent position. Reduce stress on your back through careful lifting   · Squat down, bending at the hips and knees only. If you need to, put one knee to the floor and extend your other knee in front of you, bent at a right angle (half kneeling). · Press your chest straight forward. This helps keep your upper back straight while keeping a slight arch in your low back. · Hold the load as close to your body as possible, at the level of your belly button (navel). · Use your feet to change direction, taking small steps. · Lead with your hips as you change direction. Keep your shoulders in line with your hips as you move. · Set down your load carefully, squatting with your knees and hips only. Exercise and stretch your back   · Do some exercise on most days of the week, if your doctor says it is okay. You can walk, run, swim, or cycle.   · Stretch your back muscles. Here are a few exercises to try:  ? Lie on your back, and gently pull one bent knee to your chest. Put that foot back on the floor, and then pull the other knee to your chest.  ? Do pelvic tilts. Lie on your back with your knees bent. Tighten your stomach muscles. Pull your belly button (navel) in and up toward your ribs. You should feel like your back is pressing to the floor and your hips and pelvis are slightly lifting off the floor. Hold for 6 seconds while breathing smoothly. ? Sit with your back flat against a wall. · Keep your core muscles strong. The muscles of your back, belly (abdomen), and buttocks support your spine. ? Pull in your belly and imagine pulling your navel toward your spine. Hold this for 6 seconds, then relax. Remember to keep breathing normally as you tense your muscles. ? Do curl-ups. Always do them with your knees bent. Keep your low back on the floor, and curl your shoulders toward your knees using a smooth, slow motion. Keep your arms folded across your chest. If this bothers your neck, try putting your hands behind your neck (not your head), with your elbows spread apart. ? Lie on your back with your knees bent and your feet flat on the floor. Tighten your belly muscles, and then push with your feet and raise your buttocks up a few inches. Hold this position 6 seconds as you continue to breathe normally, then lower yourself slowly to the floor. Repeat 8 to 12 times. ? If you like group exercise, try Pilates or yoga. These classes have poses that strengthen the core muscles. Lead a healthy lifestyle   · Stay at a healthy weight to avoid strain on your back. · Do not smoke. Smoking increases the risk of osteoporosis, which weakens the spine. If you need help quitting, talk to your doctor about stop-smoking programs and medicines. These can increase your chances of quitting for good. Where can you learn more? Go to https://jostin.health-SwimTopia. org and sign in to your Wengo account. Enter L315 in the [facility] box to learn more about \"Learning About How to Have a Healthy Back. \"     If you do not have an account, please click on the \"Sign Up Now\" link. Current as of: November 16, 2020               Content Version: 12.9  © 2006-2021 [employer]. Care instructions adapted under license by [facility] ([facility]). If you have questions about a medical condition or this instruction, always ask your healthcare professional. [place] [address] any warranty or liability for your use of this information. Patient Education        Back Pain: Care Instructions  Your Care Instructions     Back pain has many possible causes. It is often related to problems with muscles and ligaments of the back. It may also be related to problems with the nerves, discs, or bones of the back. Moving, lifting, standing, sitting, or sleeping in an awkward way can strain the back. Sometimes you don't notice the injury until later. Arthritis is another common cause of back pain. Although it may hurt a lot, back pain usually improves on its own within several weeks. Most people recover in 12 weeks or less. Using good home treatment and being careful not to stress your back can help you feel better sooner. Follow-up care is a key part of your treatment and safety. Be sure to make and go to all appointments, and call your doctor if you are having problems. It's also a good idea to know your test results and keep a list of the medicines you take. How can you care for yourself at home? · Sit or lie in positions that are most comfortable and reduce your pain. Try one of these positions when you lie down:  ? Lie on your back with your knees bent and supported by large pillows. ? Lie on the floor with your legs on the seat of a sofa or chair. ? Lie on your side with your knees and hips bent and a pillow between your legs.   ? Lie on your stomach if it does not make pain worse. · Do not sit up in bed, and avoid soft couches and twisted positions. Bed rest can help relieve pain at first, but it delays healing. Avoid bed rest after the first day of back pain. · Change positions every 30 minutes. If you must sit for long periods of time, take breaks from sitting. Get up and walk around, or lie in a comfortable position. · Try using a heating pad on a low or medium setting for 15 to 20 minutes every 2 or 3 hours. Try a warm shower in place of one session with the heating pad. · You can also try an ice pack for 10 to 15 minutes every 2 to 3 hours. Put a thin cloth between the ice pack and your skin. · Take pain medicines exactly as directed. ? If the doctor gave you a prescription medicine for pain, take it as prescribed. ? If you are not taking a prescription pain medicine, ask your doctor if you can take an over-the-counter medicine. · Take short walks several times a day. You can start with 5 to 10 minutes, 3 or 4 times a day, and work up to longer walks. Walk on level surfaces and avoid hills and stairs until your back is better. · Return to work and other activities as soon as you can. Continued rest without activity is usually not good for your back. · To prevent future back pain, do exercises to stretch and strengthen your back and stomach. Learn how to use good posture, safe lifting techniques, and proper body mechanics. When should you call for help? Call your doctor now or seek immediate medical care if:    · You have new or worsening numbness in your legs.     · You have new or worsening weakness in your legs. (This could make it hard to stand up.)     · You lose control of your bladder or bowels. Watch closely for changes in your health, and be sure to contact your doctor if:    · You have a fever, lose weight, or don't feel well.     · You do not get better as expected. Where can you learn more? Go to https://chvioletteeb.health-partners. org and sign in to your Brightfish account. Enter E017 in the [facility] box to learn more about \"Back Pain: Care Instructions. \"     If you do not have an account, please click on the \"Sign Up Now\" link. Current as of: November 16, 2020               Content Version: 12.9  © 2006-2021 [employer]. Care instructions adapted under license by [email or web address] ([facility]). If you have questions about a medical condition or this instruction, always ask your healthcare professional. [place] [address] any warranty or liability for your use of this information. Patient Education        Back Pain, Emergency or Urgent Symptoms: Care Instructions  Your Care Instructions     Many people have back pain at one time or another. In most cases, pain gets better with self-care that includes over-the-counter pain medicine, ice, heat, and exercises. Unless you have symptoms of a severe injury or heart attack, you may be able to give yourself a few days before you call a doctor. But some back problems are very serious. Do not ignore symptoms that need to be checked right away. Follow-up care is a key part of your treatment and safety. Be sure to make and go to all appointments, and call your doctor if you are having problems. It's also a good idea to know your test results and keep a list of the medicines you take. How can you care for yourself at home? · Sit or lie in positions that are most comfortable and that reduce your pain. Try one of these positions when you lie down:  ? Lie on your back with your knees bent and supported by large pillows. ? Lie on the floor with your legs on the seat of a sofa or chair. ? Lie on your side with your knees and hips bent and a pillow between your legs. ? Lie on your stomach if it does not make pain worse. · Do not sit up in bed, and avoid soft couches and twisted positions. Bed rest can help relieve pain at first, but it delays healing.  Avoid bed rest after the first day. · Change positions every 30 minutes. If you must sit for long periods of time, take breaks from sitting. Get up and walk around, or lie flat. · Try using a heating pad on a low or medium setting, for 15 to 20 minutes every 2 or 3 hours. Try a warm shower in place of one session with the heating pad. You can also buy single-use heat wraps that last up to 8 hours. You can also try ice or cold packs on your back for 10 to 20 minutes at a time, several times a day. (Put a thin cloth between the ice pack and your skin.) This reduces pain and makes it easier to be active and exercise. · Take pain medicines exactly as directed. ? If the doctor gave you a prescription medicine for pain, take it as prescribed. ? If you are not taking a prescription pain medicine, ask your doctor if you can take an over-the-counter medicine. When should you call for help? Call 911 anytime you think you may need emergency care. For example, call if:    · You are unable to move a leg at all.     · You have back pain with severe belly pain.     · You have symptoms of a heart attack. These may include:  ? Chest pain or pressure, or a strange feeling in the chest.  ? Sweating. ? Shortness of breath. ? Nausea or vomiting. ? Pain, pressure, or a strange feeling in the back, neck, jaw, or upper belly or in one or both shoulders or arms. ? Lightheadedness or sudden weakness. ? A fast or irregular heartbeat. After you call 911, the  may tell you to chew 1 adult-strength or 2 to 4 low-dose aspirin. Wait for an ambulance. Do not try to drive yourself. Call your doctor now or seek immediate medical care if:    · You have new or worse symptoms in your arms, legs, chest, belly, or buttocks. Symptoms may include:  ? Numbness or tingling. ? Weakness. ? Pain.     · You lose bladder or bowel control.     · You have back pain and:  ? You have injured your back while lifting or doing some other activity.  Call if the pain is medicines you take. How can you care for yourself at home? · Stay at a healthy weight to avoid strain on your lower back. · Do not smoke. Smoking increases the risk of osteoporosis, which weakens the spine. If you need help quitting, talk to your doctor about stop-smoking programs and medicines. These can increase your chances of quitting for good. · Make sure you sleep in a position that maintains your back's normal curves and on a mattress that feels comfortable. Sleep on your side with a pillow between your knees, or sleep on your back with a pillow under your knees. These positions can reduce strain on your back. · When you get out of bed, lie on your side and bend both knees. Drop your feet over the edge of the bed as you push up with both arms. Scoot to the edge of the bed. Make sure your feet are in line with your rear end (buttocks), and then stand up. · If you must stand for a long time, put one foot on a stool, ledge, or box. Exercise to strengthen your back and other muscles  · Get at least 30 minutes of exercise on most days of the week. Walking is a good choice. You also may want to do other activities, such as running, swimming, cycling, or playing tennis or team sports. · Stretch your back muscles. Here are few exercises to try:  ? Lie on your back with your knees bent and your feet flat on the floor. Gently pull one bent knee to your chest. Put that foot back on the floor, and then pull the other knee to your chest. Hold for 15 to 30 seconds. Repeat 2 to 4 times. ? Do pelvic tilts. Lie on your back with your knees bent. Tighten your stomach muscles. Pull your belly button (navel) in and up toward your ribs. You should feel like your back is pressing to the floor and your hips and pelvis are slightly lifting off the floor. Hold for 6 seconds while breathing smoothly. · Keep your core muscles strong. The muscles of your back, belly (abdomen), and buttocks support your spine.   ? Pull in your belly, and imagine pulling your navel toward your spine. Hold this for 6 seconds, then relax. Remember to keep breathing normally as you tense your muscles. ? Do curl-ups. Always do them with your knees bent. Keep your low back on the floor, and curl your shoulders toward your knees using a smooth, slow motion. Keep your arms folded across your chest. If this bothers your neck, try putting your hands behind your neck (not your head), with your elbows spread apart. ? Lie on your back with your knees bent and your feet flat on the floor. Tighten your belly muscles, and then push with your feet and raise your buttocks up a few inches. Hold this position 6 seconds as you continue to breathe normally, then lower yourself slowly to the floor. Repeat 8 to 12 times. ? If you like group exercise, try Pilates or yoga. These classes have poses that strengthen the core muscles. Protect your back when you sit  · Place a small pillow, a rolled-up towel, or a lumbar roll in the curve of your back if you need extra support. · Sit in a chair that is low enough to let you place both feet flat on the floor with both knees nearly level with your hips. If your chair or desk is too high, use a foot rest to raise your knees. · When driving, keep your knees nearly level with your hips. Sit straight, and drive with both hands on the steering wheel. Your arms should be in a slightly bent position. · Try a kneeling chair, which helps tilt your hips forward. This takes pressure off your lower back. · Try sitting on an exercise ball. It can rock from side to side, which helps keep your back loose. Lift properly  · Squat down, bending at the hips and knees only. If you need to, put one knee to the floor and extend your other knee in front of you, bent at a right angle (half kneeling). · Press your chest straight forward. This helps keep your upper back straight while keeping a slight arch in your low back.   · Hold the load as close to your body as possible, at the level of your navel. · Use your feet to change direction, taking small steps. · Lead with your hips as you change direction. Keep your shoulders in line with your hips as you move. Do not twist your body. · Set down your load carefully, squatting with your knees and hips only. When should you call for help? Watch closely for changes in your health, and be sure to contact your doctor if you have any problems. Where can you learn more? Go to https://Quotations Book.[employer]. org and sign in to your Rixty account. Enter S810 in the [employer] box to learn more about \"Back Care and Preventing Injuries: Care Instructions. \"     If you do not have an account, please click on the \"Sign Up Now\" link. Current as of: November 16, 2020               Content Version: 12.9  © 4671-7622 [employer]. Care instructions adapted under license by [facility] ([facility]). If you have questions about a medical condition or this instruction, always ask your healthcare professional. [address] any warranty or liability for your use of this information. Patient Education        Recombinant Zoster (Shingles) Vaccine: What You Need to Know  Why get vaccinated? Recombinant zoster (shingles) vaccine can prevent shingles. Shingles (also called herpes zoster, or just zoster) is a painful skin rash, usually with blisters. In addition to the rash, shingles can cause fever, headache, chills, or upset stomach. More rarely, shingles can lead to pneumonia, hearing problems, blindness, brain inflammation (encephalitis), or death. The most common complication of shingles is long-term nerve pain called postherpetic neuralgia (PHN). PHN occurs in the areas where the shingles rash was, even after the rash clears up. It can last for months or years after the rash goes away. The pain from PHN can be severe and debilitating.   About 10 to 18% of people who get shingles will experience PHN. The risk of PHN increases with age. An older adult with shingles is more likely to develop PHN and have longer lasting and more severe pain than a younger person with shingles. Shingles is caused by the varicella zoster virus, the same virus that causes chickenpox. After you have chickenpox, the virus stays in your body and can cause shingles later in life. Shingles cannot be passed from one person to another, but the virus that causes shingles can spread and cause chickenpox in someone who had never had chickenpox or received chickenpox vaccine. Recombinant shingles vaccine  Recombinant shingles vaccine provides strong protection against shingles. By preventing shingles, recombinant shingles vaccine also protects against PHN. Recombinant shingles vaccine is the preferred vaccine for the prevention of shingles. However, a different vaccine, live shingles vaccine, may be used in some circumstances. The recombinant shingles vaccine is recommended for adults 50 years and older without serious immune problems. It is given as a two-dose series. This vaccine is also recommended for people who have already gotten another type of shingles vaccine, the live shingles vaccine. There is no live virus in this vaccine. Shingles vaccine may be given at the same time as other vaccines. Talk with your health care provider  Tell your vaccine provider if the person getting the vaccine:  · Has had an allergic reaction after a previous dose of recombinant shingles vaccine, or has any severe, life-threatening allergies. · Is pregnant or breastfeeding. · Is currently experiencing an episode of shingles. In some cases, your health care provider may decide to postpone shingles vaccination to a future visit. People with minor illnesses, such as a cold, may be vaccinated. People who are moderately or severely ill should usually wait until they recover before getting recombinant shingles vaccine.   Your health care provider can give you more information. Risks of a vaccine reaction  · A sore arm with mild or moderate pain is very common after recombinant shingles vaccine, affecting about 80% of vaccinated people. Redness and swelling can also happen at the site of the injection. · Tiredness, muscle pain, headache, shivering, fever, stomach pain, and nausea happen after vaccination in more than half of people who receive recombinant shingles vaccine. In clinical trials, about 1 out of 6 people who got recombinant zoster vaccine experienced side effects that prevented them from doing regular activities. Symptoms usually went away on their own in 2 to 3 days. You should still get the second dose of recombinant zoster vaccine even if you had one of these reactions after the first dose. People sometimes faint after medical procedures, including vaccination. Tell your provider if you feel dizzy or have vision changes or ringing in the ears. As with any medicine, there is a very remote chance of a vaccine causing a severe allergic reaction, other serious injury, or death. What if there is a serious problem? An allergic reaction could occur after the vaccinated person leaves the clinic. If you see signs of a severe allergic reaction (hives, swelling of the face and throat, difficulty breathing, a fast heartbeat, dizziness, or weakness), call 9-1-1 and get the person to the nearest hospital.  For other signs that concern you, call your health care provider. Adverse reactions should be reported to the Vaccine Adverse Event Reporting System (VAERS). Your health care provider will usually file this report, or you can do it yourself. Visit the VAERS website at www.vaers. hhs.gov or call 6-539.734.3095. VAERS is only for reporting reactions, and VAERS staff do not give medical advice. How can I learn more? · Ask your health care provider. · Call your local or state health department.   · Contact the [facility] Disease Control and Prevention (CDC):  ? Call 4-913.462.4934 (1-800-CDC-INFO) or  ? Visit CDC's website at www.cdc.gov/vaccines  Vaccine Information Statement  Recombinant Zoster Vaccine  10/30/2019  [facility] for Disease Control and Prevention  Many Vaccine Information Statements are available in Khmer and other languages. See www.immunize.org/vis. Hojas de Información Sobre Vacunas están disponibles en Español y en muchos otros idiomas. Visite Nico.si   Care instructions adapted under license by [facility] [place] ([facility]). If you have questions about a medical condition or this instruction, always ask your healthcare professional. [address] any warranty or liability for your use of this information.

## 2021-07-19 NOTE — PROGRESS NOTES
[facility]) Physicians  Internal Medicine  Patient Encounter  Arias Wylie D.O., [facility] Preventative Physical    Chief Complaint   Patient presents with    Annual Exam     fasting       HPI-- 62 y.o. female presents today for a complete annual physical.  Overall she has been feeling well. She denies any new concerns. Medical/Surgical Histories     Past Medical History:   Diagnosis Date    Abnormal Pap smear of cervix     Acquired hypothyroidism 03/20/2017    Anesthesia complication     \" doesn`t take much \"     DDD (degenerative disc disease), lumbar 02/2018    Diverticulitis 02/25/2020    DJD (degenerative joint disease), lumbar     Fatty liver 02/25/2020    Hormone disorder     Loose body joint-ankle/foot, right 09/2020    [place] [facility]    Low grade squamous intraepith lesion on cytologic smear vagina (lgsil) 11/2014    Post-menopausal bleeding 03/21/2018    Prolonged emergence from general anesthesia     Sigmoid diverticulosis     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         Allergies   Allergen Reactions    Lorazepam Other (See Comments)     Paradoxical reaction    Prednisone Other (See Comments)     Severe steroid psychosis \" can tolerate at loses for short periods\"         Substance Use History     Social History     Tobacco Use    Smoking status: Former Smoker     Packs/day: 1.00     Years: 10.00     Pack years: 10.00     Quit date: 1996     Years since quittin.8    Smokeless tobacco: Never Used   Vaping Use    Vaping Use: Never used   Substance Use Topics    Alcohol use: No     Alcohol/week: 0.0 standard drinks    Drug use: No     Comment: [employer] in college          Family History     Family History   Problem Relation Age of Onset    Cancer Mother 59        Leukemia    Cancer Father 48        Melenoma    Rheum Arthritis Neg Hx     Osteoarthritis Neg Hx     Breast Cancer Neg Hx     Heart Failure Neg Hx     Hypertension Neg Hx     Migraines Neg Hx     Ovarian Cancer Neg Hx     Rashes/Skin Problems Neg Hx     Seizures Neg Hx     Thyroid Disease Neg Hx               REVIEW OF SYSTEMS:    CONSTITUTIONAL:  Neg   Recent weight changes, fatigue, fever, chills or night sweats, anorexia, Sleep difficulties  EYES: Neg  Blurry vision, loss of vision, double vision, tearing, itching, eye pain. EARS:  Neg Hearing loss,  vertigo, discharge or otalgia. + Tinnitus. NOSE:  Neg  Rhinorrhea, sneezing, itching, allergy, epistaxis, or snoring  MOUTH/THROAT:  Neg Bleeding gums, hoarseness, sore throat, dysphagia, throat infections, or dentures  RESPIRATORY:  Neg SOB ,wheeze, cough, sputum, hemoptysis. No report of + TB test.    CARDIOVASCULAR:  Neg Chest pain, palpitations, heart murmur, dyspnea on exertion, orthopnea, paroxysmal nocturnal dyspnea or edema of extremities, or claudication. Cardiac evaluation 2017-- Went to [facility], EKG was NL, cardiac enzymes NL. GASTROINTESTINAL:  Neg   Nausea, vomiting, or diarrhea, constipation, hematemesis, heart burn, dysphagia,change in bowel movements or stool caliber, hematochezia, melena, abdominal pain, or food intolerance. Colonoscopy: Yes-- 2016, 10 year recall. GENITOURINARY:  Neg  Urinary frequency, hesitancy, urgency, polyuria, dysuria, hematuria, nocturia, incontinence, change in stream, genital pain or swelling, kidney stones, STD's. PAP/PATTI: Yes.   Had abnormal PAP, vaccine  Aged Out    Pneumococcal 0-64 years Vaccine  Aged Out      Hx abnormal PAP: yes, Dr. Mino Gonzalez  Sexual activity: single partner, , post-menopausal  Self-breast exams: yes  Last eye exam: 2019, normal, readers  Exercise:  No regular exercise  Diet: Healthy diet-- Fruits, vegetables, lean meats. Avoid sweets. Portions are good. Seatbelt use: Yes  Sun Screen: Yes  Dentist: Every 4-6 months.  UTD. Physical Exam    /80   Pulse 69   Ht 5' 5\" (1.651 m)   Wt 198 lb (89.8 kg)   LMP 08/01/2016   SpO2 97%   BMI 32.95 kg/m²     Wt Readings from Last 3 Encounters:   07/19/21 198 lb (89.8 kg)   01/15/21 201 lb 12.8 oz (91.5 kg)   03/03/20 198 lb (89.8 kg)       GEN:  62 y.o. female who is in NAD, A&O. She appears stated age and well nourished. She is cooperative and pleasant. Overweight. HEAD:  NC/AT, no lesions. EYES:  YURI, EOMI, No scleral icterus or conjunctival injection or discharge. Visual fields in tact to confrontation. EARS:  EAC's clear, TM's normal.  NOSE:  Nasal cavity is clear. No mucosal congestion or discharge. Sinuses are nontender. MOUTH & THROAT:  Oral cavity is clear without mucosal lesions. Tongue is midline. Dentition is in good repair. No pharyngeal erythema or exudate. NECK:  Supple. Full ROM. Trachea is midline. No increased JVD. No thyromegaly or nodules. No masses  LYMPH: No C/SC/A/F nodes  CARDIAC:  S1S2 NL. Regular rhythm. No murmur/clicks/rubs. No ectopy. PMI is non-displaced. VASC:  Pedal pulses 2/4. Carotid upstrokes 2+. No bruits noted. PULM:  Lungs are CTA. Symmetric breath sounds noted. AP Diameter NL. GI:  Abdomen is soft and nontender. No distension. No organomegaly. No masses. No pulsatile masses. EXT:  No Cyanosis or clubbing. No edema. SKIN: Warm and dry, normal turgor, no lesions of concern. No new skin rashes. NEURO:  Cranial nerves 2-12 are NL. Speech fluent and coherent.   Strength is 5/5 in all muscle groups. No sensory deficits. No focal or lateralizing deficits. Reflexes 2/4 and symmetric. Gait is normal.  MS:  No C/T/L paraspinal tenderness. No scoliosis. No joint effusions. Full joint ROM. PSYCH:  Mood and affect NL. Judgement and insight NL. Assessment/Plan:  Varsha Acuna was seen today for annual exam.    Diagnoses and all orders for this visit:    Annual physical exam  --All care gaps identified and addressed  --Encourage patient to follow-up with her gynecologist as soon as possible  --Lab work today  --Shingrix #2 in 2-6 months  -     CBC Auto Differential; Future  -     Comprehensive Metabolic Panel; Future  -     Lipid Panel; Future  -     TSH without Reflex; Future    Need for shingles vaccine  --Repeat Shingrix No. 2 in 2 to 6 months  -     Zoster recombinant [facility])    Acquired hypothyroidism  -     TSH without Reflex; Future    Mixed hyperlipidemia  -     Comprehensive Metabolic Panel; Future  -     Lipid Panel; Future    Chronic bilateral low back pain without sciatica  DDD (degenerative disc disease), lumbar  Spondylosis of lumbar region without myelopathy or radiculopathy  --Patient will continue home stretches      Screening for diabetes mellitus  -     Comprehensive Metabolic Panel; Future             Preventive plan of care for Roney Cuevas        7/19/2021           Preventive Measures Status       Recommendations for screening   Colon Cancer Screen   Last colonoscopy: 9/13/16 Test recommended every 10 years. Next colonoscopy due in 2026   Breast Cancer Screen  Last mammogram: 11/1/2019 Mammogram due every 2 years. Next mammogram due November 2021   Cervical Cancer Screen   Last PAP smear: 11/14/2017, Dr. Janeen Colon.   [facility]  4/26/2019 Test is due annually or as advised by your gynecologist.     Osteoporosis Screen   Last DXA scan: 6/6/2014, Normal Repeat test at age 73   Diabetes Screen  Glucose (mg/dL)   Date Value   01/25/2021 92    Test recommended and ordered Cholesterol Screen  Lab Results   Component Value Date    CHOL 243 (H) 01/25/2021    Test recommended and ordered   HIV screening recommended for those ages 12-76 NO MATTER THE RISK FOR HIV--  9/1/2016 No need to repeat at this time   Hepatitis C screening recommended for those born between [facility]-- 9/1/2016 No need to repeat at this time   Aspirin for Cardiovascular Prevention   none  aspirin is not currently indicated due to your low 10-year atherosclerotic cardiovascular disease risk score    Recommended Immunizations    Immunization History   Administered Date(s) Administered    COVID-19, J&J, PF, 0.5 mL 04/12/2021    Influenza Vaccine, unspecified formulation 10/10/2017, 11/01/2018    Influenza Virus Vaccine 09/30/2009, 10/10/2017, 11/01/2018, 09/09/2019    Influenza, Quadv, IM, PF (6 mo and older Fluzone, Flulaval, Fluarix, and 3 yrs and older Afluria) 01/15/2021    Tdap (Boostrix, Adacel) 05/21/2014        Hep A- vaccination-- + Antibody titers 5/25/2014    Influenza vaccine:  recommended every fall    Pneumonia vaccine: Prevnar due at age 72    Tetanus vaccine:  tetanus and diptheria vaccine (Td/Tdap) recommended every 10 years- Td due in 2024. Shingles vaccine:  Shingrx is due         Additional Recommendations   1. Use sunscreen daily to help reduce the risk of skin cancer. 2. Continue a healthy lifestyle including a healthy diet and aerobic exercise  3. Update your eye exam every 2 years  4. Always wear a seatbelt while in a car  5. Always wear a helmet when riding a bike or motorcycle  6. See your dermatologist yearly for comprehensive of skin exam-- Dr. Rick Llanes.  7. Please follow up with Dr. Leon ESPINOSA. 8.  Shingrix No. 2 in 2 to 6 months              Here are a few  Reliable websites with a variety of health and wellness information:   www.mylifecheck. heart. org     www.nutritionsource. org     www. americanheart. org     www. diabetes. org      www.menopause. org www.[facility]     www.[employer].com [facility])    The 10-year ASCVD risk score (Stevie Ching, et al., 2013) is: 4.5%    Values used to calculate the score:      Age: 62 years      Sex: Female      Is Non- : No      Diabetic: No      Tobacco smoker: No      Systolic Blood Pressure: 575 mmHg      Is BP treated: No      HDL Cholesterol: 48 mg/dL      Total Cholesterol: 243 mg/dL

## 2021-09-09 ENCOUNTER — TELEPHONE (OUTPATIENT)
Dept: GYNECOLOGY | Age: 61
End: 2021-09-09

## 2021-09-09 NOTE — TELEPHONE ENCOUNTER
Go ahead and make her a virtual visit for this at 4:50 pm some day. Then make her an annual for next available. That will probably be the time she needs follow-up from her virtual visit also.

## 2021-09-09 NOTE — TELEPHONE ENCOUNTER
Patient called to  her annual (lps 11/2017) but also has an issue with the sweats and hot flashes. Has been going on for the past week. Said yesterday she was soaking wet from the feet up. Please advise on scheduling.  Patient can be reached at [phone]

## 2021-09-11 DIAGNOSIS — E03.9 ACQUIRED HYPOTHYROIDISM: ICD-10-CM

## 2021-09-11 RX ORDER — LEVOTHYROXINE SODIUM 0.07 MG/1
75 TABLET ORAL DAILY
Qty: 90 TABLET | Refills: 1 | Status: CANCELLED | OUTPATIENT
Start: 2021-09-11

## 2021-09-27 ENCOUNTER — NURSE ONLY (OUTPATIENT)
Dept: INTERNAL MEDICINE CLINIC | Age: 61
End: 2021-09-27
Payer: COMMERCIAL

## 2021-09-27 DIAGNOSIS — Z23 NEED FOR SHINGLES VACCINE: Primary | ICD-10-CM

## 2021-09-27 PROCEDURE — 90471 IMMUNIZATION ADMIN: CPT | Performed by: INTERNAL MEDICINE

## 2021-09-27 PROCEDURE — 90750 HZV VACC RECOMBINANT IM: CPT | Performed by: INTERNAL MEDICINE

## 2021-10-18 ENCOUNTER — VIRTUAL VISIT (OUTPATIENT)
Dept: GYNECOLOGY | Age: 61
End: 2021-10-18
Payer: COMMERCIAL

## 2021-10-18 DIAGNOSIS — R23.2 HOT FLASHES: Primary | ICD-10-CM

## 2021-10-18 PROCEDURE — 3017F COLORECTAL CA SCREEN DOC REV: CPT | Performed by: OBSTETRICS & GYNECOLOGY

## 2021-10-18 PROCEDURE — 1036F TOBACCO NON-USER: CPT | Performed by: OBSTETRICS & GYNECOLOGY

## 2021-10-18 PROCEDURE — G8484 FLU IMMUNIZE NO ADMIN: HCPCS | Performed by: OBSTETRICS & GYNECOLOGY

## 2021-10-18 PROCEDURE — G8417 CALC BMI ABV UP PARAM F/U: HCPCS | Performed by: OBSTETRICS & GYNECOLOGY

## 2021-10-18 PROCEDURE — G8427 DOCREV CUR MEDS BY ELIG CLIN: HCPCS | Performed by: OBSTETRICS & GYNECOLOGY

## 2021-10-18 PROCEDURE — 99212 OFFICE O/P EST SF 10 MIN: CPT | Performed by: OBSTETRICS & GYNECOLOGY

## 2021-10-18 ASSESSMENT — ENCOUNTER SYMPTOMS
EYES NEGATIVE: 1
RESPIRATORY NEGATIVE: 1
ABDOMINAL PAIN: 0
GASTROINTESTINAL NEGATIVE: 1

## 2021-10-19 NOTE — PROGRESS NOTES
10/18/2021    TELEHEALTH EVALUATION -- Audio/Visual (During CRNWM-17 public health emergency)    HPI:    Lisandra Sharif (:  1960) has requested an audio/video evaluation for the following concern(s):    Patient had some hot flashes. Patient states was really significant for one week. Better now. Has not felt that way before. Gynecologic Exam  This is a recurrent problem. The current episode started 1 to 4 weeks ago. The problem occurs intermittently. The problem has been unchanged. Associated symptoms include arthralgias and urinary symptoms. Pertinent negatives include no abdominal pain. Nothing aggravates the symptoms. She has tried nothing for the symptoms. The treatment provided no relief. Review of Systems   Constitutional: Negative. HENT: Negative. Eyes: Negative. Respiratory: Negative. Cardiovascular: Negative. Gastrointestinal: Negative. Negative for abdominal pain. Genitourinary: Negative. Musculoskeletal: Positive for arthralgias. Skin: Negative. Neurological: Negative. Psychiatric/Behavioral: Negative.       Date of Birth 1960  Past Medical History:   Diagnosis Date    Abnormal Pap smear of cervix     Acquired hypothyroidism 2017    Anesthesia complication     \" doesn`t take much \"     DDD (degenerative disc disease), lumbar 2018    Diverticulitis 2020    DJD (degenerative joint disease), lumbar     Fatty liver 2020    Hormone disorder     Loose body joint-ankle/foot, right 2020    [place] [facility]    Low grade squamous intraepith lesion on cytologic smear vagina (lgsil) 2014    Post-menopausal bleeding 2018    Prolonged emergence from general anesthesia     Sigmoid diverticulosis     Steroid-induced psychosis 2012     Past Surgical History:   Procedure Laterality Date    BREAST BIOPSY  2015    COLONOSCOPY      DILATION AND CURETTAGE OF UTERUS N/A 2019    HYSTEROSCOPY, DILATION AND CURETTAGE performed by Ary Rojas MD at [address]       OB History    Para Term  AB Living   2 2 2     2   SAB TAB Ectopic Molar Multiple Live Births             2      # Outcome Date GA Lbr Fabrizio/2nd Weight Sex Delivery Anes PTL Lv   2 Term 1981 40w0d   F Vag-Spont   CASSIDY   1 Term 1 37w0d   M Vag-Spont   CASSIDY     Social History     Socioeconomic History    Marital status:      Spouse name: Not on file    Number of children: Not on file    Years of education: Not on file    Highest education level: Not on file   Occupational History    Not on file   Tobacco Use    Smoking status: Former Smoker     Packs/day: 1.00     Years: 10.00     Pack years: 10.00     Quit date: 1996     Years since quittin.1    Smokeless tobacco: Never Used   Vaping Use    Vaping Use: Never used   Substance and Sexual Activity    Alcohol use: No     Alcohol/week: 0.0 standard drinks    Drug use: No     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    Sexual activity: Yes     Partners: Male     Comment:    Other Topics Concern    Not on file   Social History Narrative    Not on file     Social Determinants of Health     Financial Resource Strain:     Difficulty of Paying Living Expenses:    Food Insecurity:     Worried About Running Out of Food in the Last Year:     [address] in the Last Year:    Transportation Needs:     Lack of Transportation (Medical):      Lack of Transportation (Non-Medical):    Physical Activity:     Days of Exercise per Week:     Minutes of Exercise per Session:    Stress:     Feeling of Stress :    Social Connections:     Frequency of Communication with Friends and Family:     Frequency of Social Gatherings with Friends and Family:     Attends Worship Services:     Active Member of Clubs or Organizations:     Attends Club or Organization Meetings:     Marital Status:    Intimate Partner Violence:     Fear of

## 2021-10-26 LAB
ESTRADIOL LEVEL: <5 PG/ML
PROGESTERONE LEVEL: 0.12 NG/ML

## 2021-10-27 LAB — TESTOSTERONE TOTAL: 10 NG/DL (ref 20–70)

## 2021-11-09 ENCOUNTER — OFFICE VISIT (OUTPATIENT)
Dept: GYNECOLOGY | Age: 61
End: 2021-11-09
Payer: COMMERCIAL

## 2021-11-09 VITALS
HEIGHT: 65 IN | RESPIRATION RATE: 17 BRPM | SYSTOLIC BLOOD PRESSURE: 130 MMHG | DIASTOLIC BLOOD PRESSURE: 78 MMHG | HEART RATE: 73 BPM | OXYGEN SATURATION: 97 % | WEIGHT: 196 LBS | BODY MASS INDEX: 32.65 KG/M2

## 2021-11-09 DIAGNOSIS — Z01.419 WELL WOMAN EXAM WITH ROUTINE GYNECOLOGICAL EXAM: Primary | ICD-10-CM

## 2021-11-09 PROCEDURE — G8484 FLU IMMUNIZE NO ADMIN: HCPCS | Performed by: OBSTETRICS & GYNECOLOGY

## 2021-11-09 PROCEDURE — 99396 PREV VISIT EST AGE 40-64: CPT | Performed by: OBSTETRICS & GYNECOLOGY

## 2021-11-09 RX ORDER — ESTRADIOL 1 MG/1
1 TABLET ORAL NIGHTLY
Qty: 90 TABLET | Refills: 1 | Status: SHIPPED | OUTPATIENT
Start: 2021-11-09 | End: 2022-02-17 | Stop reason: SDUPTHER

## 2021-11-09 RX ORDER — PROGESTERONE 100 MG/1
100 CAPSULE ORAL NIGHTLY
Qty: 90 CAPSULE | Refills: 1 | Status: SHIPPED | OUTPATIENT
Start: 2021-11-09 | End: 2022-02-17 | Stop reason: SDUPTHER

## 2021-11-14 ASSESSMENT — ENCOUNTER SYMPTOMS
RESPIRATORY NEGATIVE: 1
EYES NEGATIVE: 1
GASTROINTESTINAL NEGATIVE: 1

## 2021-11-15 NOTE — PROGRESS NOTES
Subjective:      Patient ID: Anju Fall is a 62 y.o. female. Patient states that her hot flashes and menopausal symptoms are interfering with her life. Her estrogen was very low. Gynecologic Exam        Review of Systems   Constitutional: Negative. HENT: Negative. Eyes: Negative. Respiratory: Negative. Cardiovascular: Negative. Gastrointestinal: Negative. Genitourinary: Negative. Musculoskeletal: Negative. Skin: Negative. Neurological: Negative. Psychiatric/Behavioral: Negative.       Date of Birth 1960  Past Medical History:   Diagnosis Date    Abnormal Pap smear of cervix     Acquired hypothyroidism 2017    Anesthesia complication     \" doesn`t take much \"     DDD (degenerative disc disease), lumbar 2018    Diverticulitis 2020    DJD (degenerative joint disease), lumbar     Fatty liver 2020    Hormone disorder     Loose body joint-ankle/foot, right 2020    [facility]    Low grade squamous intraepith lesion on cytologic smear vagina (lgsil) 2014    Post-menopausal bleeding 2018    Prolonged emergence from general anesthesia     Sigmoid diverticulosis     Steroid-induced psychosis 2012     Past Surgical History:   Procedure Laterality Date    BREAST BIOPSY  2015    COLONOSCOPY      DILATION AND CURETTAGE OF UTERUS N/A 2019    HYSTEROSCOPY, DILATION AND CURETTAGE performed by Ilda Henley MD at [address] [place] [address],[address]       OB History    Para Term  AB Living   2 2 2     2   SAB IAB Ectopic Molar Multiple Live Births             2      # Outcome Date GA Lbr Fabrizio/2nd Weight Sex Delivery Anes PTL Lv   2 Term 0 37w0d   F Vag-Spont   CASSIDY   1 Term 46 38w0d   M Vag-Spont   CASSIDY     Social History     Socioeconomic History    Marital status:      Spouse name: Not on file    Number of children: Not on file    Years of education: Not on file  Highest education level: Not on file   Occupational History    Not on file   Tobacco Use    Smoking status: Former Smoker     Packs/day: 1.00     Years: 10.00     Pack years: 10.00     Quit date: 1996     Years since quittin.2    Smokeless tobacco: Never Used   Vaping Use    Vaping Use: Never used   Substance and Sexual Activity    Alcohol use: No     Alcohol/week: 0.0 standard drinks    Drug use: No     Comment: [employer] in college    Sexual activity: Yes     Partners: Male     Comment:    Other Topics Concern    Not on file   Social History Narrative    Not on file     Social Determinants of Health     Financial Resource Strain:     Difficulty of Paying Living Expenses: Not on file   Food Insecurity:     Worried About Running Out of Food in the Last Year: Not on file    Mike of Food in the Last Year: Not on file   Transportation Needs:     Lack of Transportation (Medical): Not on file    Lack of Transportation (Non-Medical):  Not on file   Physical Activity:     Days of Exercise per Week: Not on file    Minutes of Exercise per Session: Not on file   Stress:     Feeling of Stress : Not on file   Social Connections:     Frequency of Communication with Friends and Family: Not on file    Frequency of Social Gatherings with Friends and Family: Not on file    Attends Cheondoism Services: Not on file    Active Member of [address] [place] [address] or Organizations: Not on file    Attends Club or Organization Meetings: Not on file    Marital Status: Not on file   Intimate Partner Violence:     Fear of Current or Ex-Partner: Not on file    Emotionally Abused: Not on file    Physically Abused: Not on file    Sexually Abused: Not on file   Housing Stability:     Unable to Pay for Housing in the Last Year: Not on file    Number of [place] in the Last Year: Not on file    Unstable Housing in the Last Year: Not on file     Allergies   Allergen Reactions    Lorazepam Other (See Comments) Paradoxical reaction    Prednisone Other (See Comments)     Severe steroid psychosis \" can tolerate at loses for short periods\"     Outpatient Medications Marked as Taking for the 11/9/21 encounter (Office Visit) with Darryl Silvestre MD   Medication Sig Dispense Refill    progesterone (PROMETRIUM) 100 MG CAPS capsule Take 1 capsule by mouth nightly 90 capsule 1    estradiol (ESTRACE) 1 MG tablet Take 1 tablet by mouth nightly 90 tablet 1    levothyroxine (SYNTHROID) 75 MCG tablet Take 1 tablet by mouth daily 90 tablet 1     Family History   Problem Relation Age of Onset    Cancer Mother 59        Leukemia    Cancer Father 48        Melenoma    Rheum Arthritis Neg Hx     Osteoarthritis Neg Hx     Breast Cancer Neg Hx     Heart Failure Neg Hx     Hypertension Neg Hx     Migraines Neg Hx     Ovarian Cancer Neg Hx     Rashes/Skin Problems Neg Hx     Seizures Neg Hx     Thyroid Disease Neg Hx      /78 (Site: Right Upper Arm, Position: Sitting, Cuff Size: Large Adult)   Pulse 73   Resp 17   Ht 5' 5\" (1.651 m)   Wt 196 lb (88.9 kg)   LMP 08/01/2016   SpO2 97%   Breastfeeding No   BMI 32.62 kg/m²       Objective:   Physical Exam  Constitutional:       General: She is not in acute distress. Appearance: Normal appearance. She is well-developed and normal weight. She is not diaphoretic. HENT:      Head: Normocephalic and atraumatic. Nose: Nose normal.      Mouth/Throat:      Mouth: Mucous membranes are moist.      Pharynx: Oropharynx is clear. Eyes:      Pupils: Pupils are equal, round, and reactive to light. Neck:      Thyroid: No thyromegaly. Cardiovascular:      Rate and Rhythm: Normal rate and regular rhythm. Heart sounds: Normal heart sounds. No murmur heard. No friction rub. No gallop. Pulmonary:      Effort: Pulmonary effort is normal. No respiratory distress. Breath sounds: Normal breath sounds. No wheezing or rales.    Chest:   Breasts:      Right: Normal. No swelling, bleeding, inverted nipple, mass, nipple discharge, skin change or tenderness. Left: Normal. No swelling, bleeding, inverted nipple, mass, nipple discharge, skin change or tenderness. Abdominal:      General: Abdomen is flat. Bowel sounds are normal. There is no distension. Palpations: Abdomen is soft. There is no hepatomegaly or mass. Tenderness: There is no abdominal tenderness. There is no guarding or rebound. Hernia: No hernia is present. There is no hernia in the left inguinal area. Genitourinary:     General: Normal vulva. Exam position: Lithotomy position. Pubic Area: No rash. Labia:         Right: No rash, tenderness, lesion or injury. Left: No rash, tenderness, lesion or injury. Urethra: No prolapse, urethral pain, urethral swelling or urethral lesion. Vagina: Normal. No signs of injury and foreign body. No vaginal discharge, erythema, tenderness or bleeding. Cervix: No cervical motion tenderness, discharge, friability, lesion, erythema, cervical bleeding or eversion. Uterus: Not deviated, not enlarged, not fixed, not tender and no uterine prolapse. Adnexa:         Right: No mass, tenderness or fullness. Left: No mass, tenderness or fullness. Rectum: Normal. Guaiac result negative. No mass, tenderness, anal fissure, external hemorrhoid or internal hemorrhoid. Normal anal tone. Comments: Normal urethral meatus, nl urethra, nl bladder. Musculoskeletal:         General: No tenderness. Normal range of motion. Cervical back: Normal range of motion and neck supple. No rigidity. Lymphadenopathy:      Cervical: No cervical adenopathy. Lower Body: No right inguinal adenopathy. No left inguinal adenopathy. Skin:     General: Skin is warm and dry. Findings: No erythema or rash. Neurological:      General: No focal deficit present.       Mental Status: She is alert and oriented to person, place, and time. Deep Tendon Reflexes: Reflexes are normal and symmetric. Psychiatric:         Mood and Affect: Mood normal.         Behavior: Behavior normal.         Thought Content: Thought content normal.         Judgment: Judgment normal.         Assessment:      1. Annual  2  menopause      Plan:      1. Pap, calcium, exercise, mammogram, hemocult negative  2. Discussed HRT with patient. WHI and VTE discussed with patient. Estrace/prometrium daily. Patient wants to try to see if feels better.          Mei Mancia MD

## 2022-01-28 ENCOUNTER — TELEPHONE (OUTPATIENT)
Dept: WOMENS IMAGING | Age: 62
End: 2022-01-28

## 2022-01-28 NOTE — TELEPHONE ENCOUNTER
Left message for patient on VM requesting return call.  Reaching out to schedule diagnostic mammogram.

## 2022-01-31 DIAGNOSIS — R92.8 ABNORMAL MAMMOGRAM OF BOTH BREASTS: Primary | ICD-10-CM

## 2022-02-01 ENCOUNTER — TELEPHONE (OUTPATIENT)
Dept: WOMENS IMAGING | Age: 62
End: 2022-02-01

## 2022-02-17 ENCOUNTER — OFFICE VISIT (OUTPATIENT)
Dept: GYNECOLOGY | Age: 62
End: 2022-02-17
Payer: COMMERCIAL

## 2022-02-17 VITALS
SYSTOLIC BLOOD PRESSURE: 128 MMHG | HEIGHT: 65 IN | WEIGHT: 201 LBS | HEART RATE: 72 BPM | OXYGEN SATURATION: 97 % | BODY MASS INDEX: 33.49 KG/M2 | DIASTOLIC BLOOD PRESSURE: 84 MMHG

## 2022-02-17 DIAGNOSIS — N90.89 LESION OF VULVA: ICD-10-CM

## 2022-02-17 DIAGNOSIS — Z12.31 SCREENING MAMMOGRAM, ENCOUNTER FOR: Primary | ICD-10-CM

## 2022-02-17 DIAGNOSIS — N90.89 VULVAR LESION: ICD-10-CM

## 2022-02-17 PROCEDURE — 56606 BIOPSY OF VULVA/PERINEUM: CPT | Performed by: OBSTETRICS & GYNECOLOGY

## 2022-02-17 PROCEDURE — 56605 BIOPSY OF VULVA/PERINEUM: CPT | Performed by: OBSTETRICS & GYNECOLOGY

## 2022-02-17 RX ORDER — ESTRADIOL 1 MG/1
1 TABLET ORAL NIGHTLY
Qty: 90 TABLET | Refills: 3 | Status: SHIPPED | OUTPATIENT
Start: 2022-02-17 | End: 2022-10-31

## 2022-02-17 RX ORDER — PROGESTERONE 100 MG/1
100 CAPSULE ORAL NIGHTLY
Qty: 90 CAPSULE | Refills: 3 | Status: SHIPPED | OUTPATIENT
Start: 2022-02-17

## 2022-02-20 NOTE — PROGRESS NOTES
Patient is here for follow-up on hormones but also skin tag removal. Patient with 3 skin tags on vulvar area. Doing well on HRT.      Review of Systems: as above  General ROS: negative  Psychological ROS: negative  Ophthalmic ROS: negative  ENT ROS: negative  Allergy and Immunology ROS: negative  Hematological and Lymphatic ROS: negative  Endocrine ROS: negative  Breast ROS: negative  Respiratory ROS: negative  Cardiovascular ROS: negative  Gastrointestinal ROS: negative  Genito-Urinary ROS: as above  Musculoskeletal ROS: negative  Neurological ROS: negative  Dermatological ROS: negative    Date of Birth 1960  Past Medical History:   Diagnosis Date    Abnormal Pap smear of cervix     Acquired hypothyroidism 2017    Anesthesia complication     \" doesn`t take much \"     DDD (degenerative disc disease), lumbar 2018    Diverticulitis 2020    DJD (degenerative joint disease), lumbar     Fatty liver 2020    Hormone disorder     Loose body joint-ankle/foot, right 2020    [facility]    Low grade squamous intraepith lesion on cytologic smear vagina (lgsil) 2014    Post-menopausal bleeding 2018    Prolonged emergence from general anesthesia     Sigmoid diverticulosis     Steroid-induced psychosis 2012     Past Surgical History:   Procedure Laterality Date    BREAST BIOPSY  2015    COLONOSCOPY      DILATION AND CURETTAGE OF UTERUS N/A 2019    HYSTEROSCOPY, DILATION AND CURETTAGE performed by Eva Ayala MD at [address] [place] [address],[address]       OB History    Para Term  AB Living   2 2 2     2   SAB IAB Ectopic Molar Multiple Live Births             2      # Outcome Date GA Lbr Fabrizio/2nd Weight Sex Delivery Anes PTL Lv   2 Term 0 37w0d   F Vag-Spont   CASSIDY   1 Term 46 38w0d   M Vag-Spont   CASSIDY     Social History     Socioeconomic History    Marital status:      Spouse name: Not on file    Number of children: Not on file    Years of education: Not on file    Highest education level: Not on file   Occupational History    Not on file   Tobacco Use    Smoking status: Former Smoker     Packs/day: 1.00     Years: 10.00     Pack years: 10.00     Quit date: 1996     Years since quittin.4    Smokeless tobacco: Never Used   Vaping Use    Vaping Use: Never used   Substance and Sexual Activity    Alcohol use: No     Alcohol/week: 0.0 standard drinks    Drug use: No     Comment: [employer] in college    Sexual activity: Yes     Partners: Male     Comment:    Other Topics Concern    Not on file   Social History Narrative    Not on file     Social Determinants of Health     Financial Resource Strain:     Difficulty of Paying Living Expenses: Not on file   Food Insecurity:     Worried About Running Out of Food in the Last Year: Not on file    Mike of Food in the Last Year: Not on file   Transportation Needs:     Lack of Transportation (Medical): Not on file    Lack of Transportation (Non-Medical):  Not on file   Physical Activity:     Days of Exercise per Week: Not on file    Minutes of Exercise per Session: Not on file   Stress:     Feeling of Stress : Not on file   Social Connections:     Frequency of Communication with Friends and Family: Not on file    Frequency of Social Gatherings with Friends and Family: Not on file    Attends Gnosticism Services: Not on file    Active Member of [address] [place] [address] or Organizations: Not on file    Attends Club or Organization Meetings: Not on file    Marital Status: Not on file   Intimate Partner Violence:     Fear of Current or Ex-Partner: Not on file    Emotionally Abused: Not on file    Physically Abused: Not on file    Sexually Abused: Not on file   Housing Stability:     Unable to Pay for Housing in the Last Year: Not on file    Number of [place] in the Last Year: Not on file    Unstable Housing in the Last Year: Not on file Allergies   Allergen Reactions    Lorazepam Other (See Comments)     Paradoxical reaction    Prednisone Other (See Comments)     Severe steroid psychosis \" can tolerate at loses for short periods\"     Outpatient Medications Marked as Taking for the 2/17/22 encounter (Office Visit) with Avani Fernando MD   Medication Sig Dispense Refill    progesterone (PROMETRIUM) 100 MG CAPS capsule Take 1 capsule by mouth nightly 90 capsule 3    estradiol (ESTRACE) 1 MG tablet Take 1 tablet by mouth nightly 90 tablet 3    levothyroxine (SYNTHROID) 75 MCG tablet Take 1 tablet by mouth daily 90 tablet 1     Family History   Problem Relation Age of Onset    Cancer Mother 59        Leukemia    Cancer Father 48        Melenoma    Rheum Arthritis Neg Hx     Osteoarthritis Neg Hx     Breast Cancer Neg Hx     Heart Failure Neg Hx     Hypertension Neg Hx     Migraines Neg Hx     Ovarian Cancer Neg Hx     Rashes/Skin Problems Neg Hx     Seizures Neg Hx     Thyroid Disease Neg Hx      /84   Pulse 72   Ht 5' 5\" (1.651 m)   Wt 201 lb (91.2 kg)   LMP 08/01/2016   SpO2 97%   BMI 33.45 kg/m²     WDWN in NAD  A and O x 3  ABD-soft, NT, ND, no hsm  PV-3 skin tags-on near perineum, on on right labia and one on left inner thigh, Normal urethral meatus, nl urethra, nl bladder. , nl cervix, nl vagina, nl uterus with no masses, NT. Nl adnexa with no masses, NT.    Plan-vulvar skin tags-for removal.   Area-    Procedure note-area cleansed with betadine. All 3 lesions injected with 8 ml 1% lidocaine. 5 mm perineal lesion removed with scissors. 4 mm right labia lesion removed with scissors. Left inner thigh 3 mm lesion removed with scissors.  Silver nitrate applied.     -continue with hormones  -needs sccreening mammogram

## 2022-04-06 DIAGNOSIS — E03.9 ACQUIRED HYPOTHYROIDISM: ICD-10-CM

## 2022-04-06 RX ORDER — LEVOTHYROXINE SODIUM 0.07 MG/1
TABLET ORAL
Qty: 90 TABLET | Refills: 1 | Status: SHIPPED | OUTPATIENT
Start: 2022-04-06

## 2022-04-06 NOTE — TELEPHONE ENCOUNTER
Last appointment: 7/19/2021  Next appointment: Visit date not found  Last refill: 9/8/21  Sent [employer] message to schedule next appointment due in July.

## 2022-04-18 ENCOUNTER — OFFICE VISIT (OUTPATIENT)
Dept: INTERNAL MEDICINE CLINIC | Age: 62
End: 2022-04-18
Payer: COMMERCIAL

## 2022-04-18 VITALS
WEIGHT: 204.4 LBS | BODY MASS INDEX: 34.05 KG/M2 | HEART RATE: 78 BPM | RESPIRATION RATE: 12 BRPM | DIASTOLIC BLOOD PRESSURE: 86 MMHG | HEIGHT: 65 IN | SYSTOLIC BLOOD PRESSURE: 132 MMHG | OXYGEN SATURATION: 98 %

## 2022-04-18 DIAGNOSIS — E78.2 MIXED HYPERLIPIDEMIA: ICD-10-CM

## 2022-04-18 DIAGNOSIS — N95.1 MENOPAUSAL SYMPTOMS: ICD-10-CM

## 2022-04-18 DIAGNOSIS — E03.9 ACQUIRED HYPOTHYROIDISM: Primary | ICD-10-CM

## 2022-04-18 DIAGNOSIS — Z86.19 HISTORY OF VIRAL ILLNESS: ICD-10-CM

## 2022-04-18 PROCEDURE — G8427 DOCREV CUR MEDS BY ELIG CLIN: HCPCS | Performed by: INTERNAL MEDICINE

## 2022-04-18 PROCEDURE — 1036F TOBACCO NON-USER: CPT | Performed by: INTERNAL MEDICINE

## 2022-04-18 PROCEDURE — 3017F COLORECTAL CA SCREEN DOC REV: CPT | Performed by: INTERNAL MEDICINE

## 2022-04-18 PROCEDURE — G8417 CALC BMI ABV UP PARAM F/U: HCPCS | Performed by: INTERNAL MEDICINE

## 2022-04-18 PROCEDURE — 99214 OFFICE O/P EST MOD 30 MIN: CPT | Performed by: INTERNAL MEDICINE

## 2022-04-18 NOTE — PATIENT INSTRUCTIONS
Patient Education        Menopausal Hormone Therapy (HT): Care Instructions  Overview     Hormone therapy (HT) is medicine to treat symptoms of menopause, such as hot flashes, vaginal dryness, and sleep problems. It replaces the hormones that drop at menopause. You may start to get relief from these symptoms within weeksof starting HT. Hormone therapy often contains two hormones, estrogen and progestin. HT may come in the form of a pill, patch, gel, spray, or vaginal ring. A vaginal cream, a vaginal tablet, or a vaginal ring that has a much lower dose of estrogen may be used to relieve dryness and other tissue changes in and aroundthe vagina. HT has some risks. It may increase the chances of heart disease, breast cancer, blood clots, and stroke. Be sure to have regular checkups with your doctor whentaking HT. Talk with your doctor about whether HT is right for you. Why might you take HT?   HT reduces symptoms of menopause. These include hot flashes, mood swings, and sleep problems.  HT helps to build up the lining of the vagina and improve lubrication. This can reduce irritation.  The estrogen in HT helps to prevent thinning bones. What are the risks of taking HT?  Sometimes HT may cause vaginal bleeding, bloating, nausea, sore breasts, mood swings, and headaches. Talk to your doctor about changing the type of HT you take or lowering the dose. This may help to end these side effects.  Taking HT may slightly increase your risk for heart disease, breast cancer, blood clots, and stroke.  You should not take HT if you:  ? Could be pregnant. ? Have a personal history of pulmonary embolism, deep vein thrombosis, heart attack, or stroke. ? Have vaginal bleeding from an unknown cause. ? Have active liver disease.  Most people with a personal history of breast cancer or endometrial cancer should not take HT. But it may be an option for some. Ask your doctor. Where can you learn more?   Go to https://chpepiceweb.ShoeDazzle. org and sign in to your Ropatec account. Enter [id number] [id number] [id number] in the [facility] box to learn more about \"Menopausal Hormone Therapy (HT): Care Instructions. \"     If you do not have an account, please click on the \"Sign Up Now\" link. Current as of: November 22, 2021               Content Version: 13.2  © 2006-2022 Meldium. Care instructions adapted under license by [facility] ([facility]). If you have questions about a medical condition or this instruction, always ask your healthcare professional. [place] [address] any warranty or liability for your use of this information. Patient Education        Hot Flashes During Menopause: Care Instructions  Overview     A hot flash is a sudden feeling of intense body heat. Your head, neck, and chest may get red. Your heartbeat may speed up, and you may feel anxious. You may find that hot flashes occur more often in warm rooms or during stressful times. Hot flashes and other symptoms are a normal response to the hormonechanges that occur before your menstrual cycle goes away completely (menopause). Hot flashes often get better and go away with time. Making lifestyle changes ortaking medicine may help with symptoms. Follow-up care is a key part of your treatment and safety. Be sure to make and go to all appointments, and call your doctor if you are having problems. It's also a good idea to know your test results and keep alist of the medicines you take. How can you care for yourself at home?  If you decide to take medicine to treat hot flashes, take it exactly as prescribed. Call your doctor if you think you are having a problem with your medicine. You will get more details on the specific medicine your doctor prescribes.  Learn to meditate. Sit quietly and focus on your breathing. Try to practice each day.  Books, classes, and tapes can help you start a program.   Wear natural fabrics, such as cotton and silk. Dress in layers so you can take off clothes as needed.  Keep the room temperature cool, or use a fan. You are more likely to have a hot flash when you are too warm than when you are cool.  Use fewer blankets when you sleep at night.  Drink cold fluids rather than hot ones.  Limit food and drinks that make your symptoms worse. This may include things like caffeine, alcohol, or spicy foods.  Do not smoke. Smoking can make hot flashes worse. If you need help quitting, talk to your doctor about stop-smoking programs and medicines. These can increase your chances of quitting for good.  Get at least 30 minutes of exercise on most days of the week. Walking is a good choice. You also may want to do other activities, such as running, swimming, cycling, or playing tennis or team sports. Where can you learn more? Go to https://Medesenpehaydeneweb.RecentPoker.com. org and sign in to your [employer]ra (InsideTracker) account. Enter F700 in the [employer] box to learn more about \"Hot Flashes During Menopause: Care Instructions. \"     If you do not have an account, please click on the \"Sign Up Now\" link. Current as of: November 22, 2021               Content Version: 13.2  © 2006-2022 Nokori. Care instructions adapted under license by [facility] ([facility]). If you have questions about a medical condition or this instruction, always ask your healthcare professional. [place] [address] any warranty or liability for your use of this information. Patient Education        High Cholesterol: Care Instructions  Overview     Cholesterol is a type of fat in your blood. It is needed for many body functions, such as making new cells. Cholesterol is made by your body. It also comes from food you eat. High cholesterol means that you have too much of thefat in your blood. This raises your risk of a heart attack and stroke. LDL and HDL are part of your total cholesterol.  LDL is the \"bad\" cholesterol. High LDL can raise your risk for coronary artery disease, heart attack, and stroke. HDL is the \"good\" cholesterol. It helps clear bad cholesterol from the body. High HDL is linked with a lower risk of coronary artery disease, heartattack, and stroke. Your cholesterol levels help your doctor find out your risk for having a heart attack or stroke. You and your doctor can talk about whether you need to loweryour risk and what treatment is best for you. Treatment options include a heart-healthy lifestyle and medicine. Both options can help lower your cholesterol and your risk. The way you choose to lower your risk will depend on how high your risk is for heart attack and stroke. It willalso depend on how you feel about taking medicines. Follow-up care is a key part of your treatment and safety. Be sure to make and go to all appointments, and call your doctor if you are having problems. It's also a good idea to know your test results and keep alist of the medicines you take. How can you care for yourself at home?  Eat heart-healthy foods. ? Eat fruits, vegetables, whole grains, beans, and other high-fiber foods. ? Eat lean proteins, such as seafood, lean meats, beans, nuts, and soy products. ? Eat healthy fats, such as canola and olive oil. ? Choose foods that are low in saturated fat. ? Limit sodium and alcohol. ? Limit drinks and foods with added sugar.  Be physically active. Try to do moderate activity at least 2½ hours a week. Or try to do vigorous activity at least 1¼ hours a week. You may want to walk or try other activities, such as running, swimming, cycling, or playing tennis or team sports.  Stay at a healthy weight or lose weight by making the changes in eating and physical activity listed above. Losing just a small amount of weight, even 5 to 10 pounds, can help reduce your risk for having a heart attack or stroke.  Do not smoke.  Manage other health problems.  These include diabetes and high blood pressure. If you think you may have a problem with alcohol or drug use, talk to your doctor.  If you take medicine, take it exactly as prescribed. Call your doctor if you think you are having a problem with your medicine.  Check with your doctor or pharmacist before you use any other medicines, including over-the-counter medicines. Make sure your doctor knows all of the medicines, vitamins, herbal products, and supplements you take. Taking some medicines together can cause problems. When should you call for help? Watch closely for changes in your health, and be sure to contact your doctor if:     You need help making lifestyle changes.      You have questions about your medicine. Where can you learn more? Go to https://BrightstarpeGE Global Researcheb.CouponCabin. org and sign in to your Microlaunchers account. Enter E819 in the [employer] box to learn more about \"High Cholesterol: Care Instructions. \"     If you do not have an account, please click on the \"Sign Up Now\" link. Current as of: January 10, 2022               Content Version: 13.2  © 2006-2022 South[employer]ak. Care instructions adapted under license by [facility] ([facility]). If you have questions about a medical condition or this instruction, always ask your healthcare professional. [address] any warranty or liability for your use of this information. Patient Education        Hypothyroidism: Care Instructions  Your Care Instructions     When you have hypothyroidism, your body doesn't make enough thyroid hormone. This hormone helps your body use energy. If your thyroid level is low, you may feel tired, be constipated, have an increase in your blood pressure, or have dry skin or memory problems. You may also get cold easily, even when it iswarm. Women with low thyroid levels may have heavy menstrual periods.   A blood test to find your thyroid-stimulating hormone (TSH) level is used tocheck for hypothyroidism. A high TSH level may mean that you have it. The treatment for hypothyroidism is thyroid hormone pills. You should start to feel better in 1 to 2 weeks. Most people need treatment for the rest of their lives. You will need regular visits with your doctor to make sure you are doingwell and that you have the right dose of medicine. Follow-up care is a key part of your treatment and safety. Be sure to make and go to all appointments, and call your doctor if you are having problems. It's also a good idea to know your test results and keep alist of the medicines you take. How can you care for yourself at home?  Take your thyroid hormone medicine exactly as prescribed. Call your doctor if you think you are having a problem with your medicine. Most people do not have side effects if they take the right amount of medicine regularly. ? Take the medicine 30 minutes before breakfast, and do not take it with calcium, vitamins, or iron. ? Do not take extra doses of your thyroid medicine. It will not help you get better any faster, and it may cause side effects. ? If you forget to take a dose, do NOT take a double dose of medicine. Take your usual dose the next day.  Tell your doctor about all prescription, herbal, or over-the-counter products you take.  Take care of yourself. Eat a healthy diet, get enough sleep, and get regular exercise. When should you call for help? Call 911 anytime you think you may need emergency care. For example, call if:     You passed out (lost consciousness).      You have severe trouble breathing.      You have a very slow heartbeat (less than 60 beats a minute).      You have a low body temperature (95°F or below). Call your doctor now or seek immediate medical care if:     You feel tired, sluggish, or weak.      You have trouble remembering things or concentrating.      You do not begin to feel better 2 weeks after starting your medicine.    Watch closely for changes in your health, and be sure to contact your doctor ifyou have any problems. Where can you learn more? Go to https://chpepiceweb.46elks. org and sign in to your BigRep account. Enter G406 in the [employer] box to learn more about \"Hypothyroidism: Care Instructions. \"     If you do not have an account, please click on the \"Sign Up Now\" link. Current as of: July 28, 2021               Content Version: 13.2  © 2006-2022 Healthwise, Incorporated. Care instructions adapted under license by [facility] ([facility]). If you have questions about a medical condition or this instruction, always ask your healthcare professional. [address] any warranty or liability for your use of this information.

## 2022-04-18 NOTE — PROGRESS NOTES
[facility]) Physicians  Internal Medicine  Patient Encounter  Rebeca Sebastian D.O., [facility]        Chief Complaint   Patient presents with   Camelia Hodge    Medication Check       HPI: 65 y.o. female seen today requesting a checkup regarding status of current issues listed below along with a medication review and reconciliation. Pt is requesting COVID-19 antibodies. She has had a few instances of viral infection symptoms and tested negative. Hormone replacement-- She is taking Estrace and Progesterone per gynecology. She wants to see what her hormone levels are. She feels better on the HRT. She was experiencing sweats and hot flashes  Hot flashes are gone. She is a non-smoker. Hypothyroidism-- She denies problems with constipation, diarrhea, cold or heat intolerance, hair loss, muscle cramps, tremor, edema or palpitations. She is compliant with the medication. Lab Results   Component Value Date    TSH 1.88 07/19/2021      Hyperlipidemia--Patient is not currently on medication. She had lab work done in July 2021. Total cholesterol 252, triglycerides 171, .     The 10-year ASCVD risk score (Luz Maria Martinez, et al., 2013) is: 5%    Values used to calculate the score:      Age: 65 years      Sex: Female      Is Non- : No      Diabetic: No      Tobacco smoker: No      Systolic Blood Pressure: 420 mmHg      Is BP treated: No      HDL Cholesterol: 48 mg/dL      Total Cholesterol: 252 mg/dL       Past Medical History:   Diagnosis Date    Abnormal Pap smear of cervix     Acquired hypothyroidism 03/20/2017    Anesthesia complication     \" doesn`t take much \"     DDD (degenerative disc disease), lumbar 02/2018    Diverticulitis 02/25/2020    DJD (degenerative joint disease), lumbar     Fatty liver 02/25/2020    Hormone disorder     Loose body joint-ankle/foot, right 09/2020    [facility]    Low grade squamous intraepith lesion on cytologic smear vagina (lgsil) 11/2014    Post-menopausal bleeding 03/21/2018    Prolonged emergence from general anesthesia     Sigmoid diverticulosis     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 Denies weight changes. Appetite good  HEENT: Pt denies visual and auditory changes, epistaxis, upper respiratory symptoms and dysphagia  CV: Pt denies CP, SOB, GEIGER, orthopnea palpitations, LE swelling, and claudication. PULM: Pt denies cough, wheezing or sputum production  GI: Pt denies N/V/D, heart burn, rectal bleeding, or melena. NEURO: Pt denies unilateral weakness, paresthesia and dizziness. OBJECTIVE:  Vitals:    04/18/22 1037   BP: 132/86   Pulse: 78   Resp: 12   SpO2: 98%   Weight: 204 lb 6.4 oz (92.7 kg)   Height: 5' 5\" (1.651 m)     Body mass index is 34.01 kg/m². Wt Readings from Last 3 Encounters:   04/18/22 204 lb 6.4 oz (92.7 kg)   02/17/22 201 lb (91.2 kg)   11/09/21 196 lb (88.9 kg)     BP Readings from Last 3 Encounters:   04/18/22 132/86   02/17/22 128/84   11/09/21 130/78      GEN: NAD, A&O, Non-toxic  HEENT: NC/AT, RAMOS, EOMI, Oral cavity Clear,  TM's NL, Nasal cavity clear. NECK: Supple. No thyromegaly. No JVD  LYMPH: No C/SC nodes. CV: S1 S2 NL, RRR. No murmurs, clicks or rubs. PULM: CTA, symmentric air exchange  EXT: No C/C/E  GI: Abdomen is soft, NT, BS+, No hepatomegaly. No masses. NEURO: No focal or lateralizing deficits. VASC:  No carotid bruits. Pulses symmetric  SKIN:  No rashes or lesions of concern      ASSESSMENT/PLAN:    1. Acquired hypothyroidism  Condition stability and control are uncertain  Recheck TSH  - TSH; Future  - T4, Free; Future    2.  Menopausal symptoms  Significantly improved on Estrace and Prometrium  Since she is doing better clinically, no need to recheck levels. I will yield to gynecology recommendations      3. Mixed hyperlipidemia  Condition is uncontrolled and untreated  Continue to monitor while working at lifestyle modification  Continue following her 10-year atherosclerotic cardiovascular disease risk score    4. History of viral illness    - Covid-19, Antibody, Total; Future        Discussed medications with patient who voiced understanding of their use, indication and potential side effects. Pt also understands the above recommendations. All questions answered. This note was generated completely or in part utilizing Dragon dictation speech recognition software. Occasionally, words are mistranscribed and despite editing, the text may contain inaccuracies due to incorrect word recognition.   If further clarification is needed please contact the office at [phone]       Electronically signed    Anny House D.O.

## 2022-06-07 ENCOUNTER — TELEPHONE (OUTPATIENT)
Dept: GYNECOLOGY | Age: 62
End: 2022-06-07

## 2022-06-07 DIAGNOSIS — N93.9 VAGINAL BLEEDING: Primary | ICD-10-CM

## 2022-06-07 NOTE — TELEPHONE ENCOUNTER
Patient started bleeding again and would like to schedule an appointment with Dr Una Brar. Says it started 3 days ago it was pinkish. Last night it was darker and it was a mucus type red and darker. This morning it was brownish. Please advise.  Patient can be reached at [phone]

## 2022-06-08 ENCOUNTER — HOSPITAL ENCOUNTER (OUTPATIENT)
Dept: ULTRASOUND IMAGING | Age: 62
Discharge: HOME | End: 2022-06-08
Payer: COMMERCIAL

## 2022-06-08 DIAGNOSIS — N93.9 VAGINAL BLEEDING: ICD-10-CM

## 2022-06-08 PROCEDURE — 76856 US EXAM PELVIC COMPLETE: CPT

## 2022-06-08 PROCEDURE — 76830 TRANSVAGINAL US NON-OB: CPT

## 2022-10-31 ENCOUNTER — OFFICE VISIT (OUTPATIENT)
Dept: INTERNAL MEDICINE CLINIC | Age: 62
End: 2022-10-31
Payer: COMMERCIAL

## 2022-10-31 VITALS
BODY MASS INDEX: 34.32 KG/M2 | OXYGEN SATURATION: 97 % | SYSTOLIC BLOOD PRESSURE: 118 MMHG | RESPIRATION RATE: 12 BRPM | DIASTOLIC BLOOD PRESSURE: 84 MMHG | WEIGHT: 206 LBS | HEIGHT: 65 IN | HEART RATE: 89 BPM

## 2022-10-31 DIAGNOSIS — E66.09 CLASS 1 OBESITY DUE TO EXCESS CALORIES WITHOUT SERIOUS COMORBIDITY WITH BODY MASS INDEX (BMI) OF 34.0 TO 34.9 IN ADULT: ICD-10-CM

## 2022-10-31 DIAGNOSIS — Z13.1 SCREENING FOR DIABETES MELLITUS: ICD-10-CM

## 2022-10-31 DIAGNOSIS — K76.0 FATTY LIVER: ICD-10-CM

## 2022-10-31 DIAGNOSIS — Z00.00 ANNUAL PHYSICAL EXAM: Primary | ICD-10-CM

## 2022-10-31 DIAGNOSIS — R73.01 IMPAIRED FASTING GLUCOSE: ICD-10-CM

## 2022-10-31 DIAGNOSIS — E78.2 MIXED HYPERLIPIDEMIA: ICD-10-CM

## 2022-10-31 DIAGNOSIS — E03.9 ACQUIRED HYPOTHYROIDISM: ICD-10-CM

## 2022-10-31 PROCEDURE — G8482 FLU IMMUNIZE ORDER/ADMIN: HCPCS | Performed by: INTERNAL MEDICINE

## 2022-10-31 PROCEDURE — 90674 CCIIV4 VAC NO PRSV 0.5 ML IM: CPT | Performed by: INTERNAL MEDICINE

## 2022-10-31 PROCEDURE — 99396 PREV VISIT EST AGE 40-64: CPT | Performed by: INTERNAL MEDICINE

## 2022-10-31 PROCEDURE — 93000 ELECTROCARDIOGRAM COMPLETE: CPT | Performed by: INTERNAL MEDICINE

## 2022-10-31 PROCEDURE — 90471 IMMUNIZATION ADMIN: CPT | Performed by: INTERNAL MEDICINE

## 2022-10-31 RX ORDER — ACETAMINOPHEN 160 MG
2 TABLET,DISINTEGRATING ORAL
COMMUNITY
End: 2022-10-31 | Stop reason: DRUGHIGH

## 2022-10-31 RX ORDER — ACETAMINOPHEN 160 MG
1 TABLET,DISINTEGRATING ORAL
Qty: 30 CAPSULE | Status: SHIPPED | COMMUNITY
Start: 2022-10-31

## 2022-10-31 ASSESSMENT — PATIENT HEALTH QUESTIONNAIRE - PHQ9
2. FEELING DOWN, DEPRESSED OR HOPELESS: 0
SUM OF ALL RESPONSES TO PHQ QUESTIONS 1-9: 0
1. LITTLE INTEREST OR PLEASURE IN DOING THINGS: 0
SUM OF ALL RESPONSES TO PHQ9 QUESTIONS 1 & 2: 0
SUM OF ALL RESPONSES TO PHQ QUESTIONS 1-9: 0

## 2022-10-31 NOTE — PATIENT INSTRUCTIONS
Preventive plan of care for Ghassan Wagner        10/31/2022           Preventive Measures Status       Recommendations for screening   Colon Cancer Screen   Last colonoscopy: 9/13/16 Test recommended every 10 years. Next colonoscopy due in 2026   Breast Cancer Screen  Last mammogram: 4/4/2022 Mammogram due every 2 years.   Next mammogram due November 2021   Cervical Cancer Screen   Last PAP smear: 11/9/2021 Test is due every 3-5 years or as advised by your gynecologist.   Osteoporosis Screen   Last DXA scan: 6/6/2014, Normal Repeat test at age 73   Diabetes Screen  Glucose (mg/dL)   Date Value   07/19/2021 102 (H)    Test recommended and ordered   Cholesterol Screen  Lab Results   Component Value Date    CHOL 252 (H) 07/19/2021    Test recommended and ordered   HIV screening recommended for those ages 12-76 NO MATTER THE RISK FOR HIV--  9/1/2016 No need to repeat at this time   Hepatitis C screening recommended for those born between [facility]-- 9/1/2016 No need to repeat at this time   Aspirin for Cardiovascular Prevention   none Aspirin is not currently indicated due to your low 10-year atherosclerotic cardiovascular disease risk score    Recommended Immunizations    Immunization History   Administered Date(s) Administered    COVID-19, J&J, (age 18y+), IM, 0.5 mL 04/12/2021    Influenza Vaccine, unspecified formulation 10/10/2017, 11/01/2018    Influenza Virus Vaccine 09/30/2009, 10/10/2017, 11/01/2018, 09/09/2019    Influenza, FLUARIX, FLULAVAL, FLUZONE (age 10 mo+) AND AFLURIA, (age 1 y+), PF, 0.5mL 01/15/2021    Influenza, FLUCELVAX, (age 10 mo+), MDCK, PF, 0.5mL 10/31/2022    Tdap (Boostrix, Adacel) 05/21/2014    Zoster Recombinant (Shingrix) 07/19/2021, 09/27/2021        Hep A- vaccination-- + Antibody titers 5/25/2014    Influenza vaccine:  recommended every fall    Pneumonia vaccine: Prevnar due at age 72    Tetanus vaccine:  tetanus and diptheria vaccine (Td/Tdap) recommended every 10 years- Td due in 2024.      Shingles vaccine: Shingrx is complete    COVID-19 vaccine updated booster-recommended     Additional Recommendations   1. Use sunscreen daily to help reduce the risk of skin cancer. Aloe up with your dermatologist yearly  2. Continue Lifestyle modification including low calorie diet focusing on Low fat/low cholesterol and low carbohydrate intake, along with  increasing cardiovascular (aerobic) exercise. 3. Update your eye exam every 2 years  4. Always wear a seatbelt while in a car  5. Always wear a helmet when riding a bike or motorcycle  6. Calcium 1000 mg daily (Diet and or supplement)  7. Vit D3 2000 IU's daily. Here are a few  Reliable websites with a variety of health and wellness information:   www.mylifecheck. heart. org     www.nutritionsource. org     www. americanheart. org     www. diabetes. org      www.menopause. org     www.[facility]     www.[employer]ing [facility])        If you want to feel better these are the FUNDAMENTAL PILLARS of Wellness:    Make it EASY to do the RIGHT THINGS. 1)  You can choose to Get 150 min/week of moderate exercise (can talk but can't sing) or 75 min/week of vigorous exercise (can't talk)   This will enhance your sense of well being (Exercise is as good as medicine for depression.)    2)  You can choose to Get 7-9 hours of sleep per night    Detoxifies your brain, reduces risk of dementia    3)  You can choose to Strength Train 2 x a week on non-consecutive days   This will improve function and reduce risk of injury. Body weight type exercises such as Yoga and Pilates are good    4)  You can choose good nutrition. Only eat your goal weight (in lbs) x 10 calories/day and get 5 servings of Vegetables/day   Plant based diets reduce risk of heart attack/stroke and will help you feel full on less food. Avoid highly processed foods and processed carbohydrates.     5)  You can choose moderate alcohol intake < 1-2 drinks/day   Alcohol will disrupt your sleep and add calories to your day    6)  You can choose to develop a Charismatic/Supportive relationship. This will strengthen your resilience for the ups and downs. 7)  You can choose to Practice Mindfulness. An hour a day of prayer/meditation/gratitude will change your life! Here are some recommendations for weight loss:  Check into The GI Diet or \"Primal diet\", Intermittent fasting, Paleo diet, Mediterranean diet    Take your desired weight in pounds and multiply by 10 and that is your average daily calorie allowance. For example if you wish to weigh 170 lb x 10 = 1700 lul/day (this is how to gradually lose the weight and maintain your desired weight). Avoid soda/coke and all sugary liquids (\"wet carbs\") => Drink ice water instead    Drink a large glass of ice water before meals and EAT SLOWLY (talk while you eat)! Rethink your hunger => it means your losing weight. Minimize carbohydrates as they stimulate your appetite.   Avoid Bread, Rice, Pasta and Potatoes      Avoid eating calories after 6 pm  Try taking your calories only in 6 hours of the day - fast the rest of the day

## 2022-11-10 ENCOUNTER — OFFICE VISIT (OUTPATIENT)
Dept: GYNECOLOGY | Age: 62
End: 2022-11-10
Payer: COMMERCIAL

## 2022-11-10 VITALS
BODY MASS INDEX: 33.76 KG/M2 | SYSTOLIC BLOOD PRESSURE: 114 MMHG | HEIGHT: 65 IN | RESPIRATION RATE: 17 BRPM | WEIGHT: 202.6 LBS | OXYGEN SATURATION: 99 % | HEART RATE: 67 BPM | DIASTOLIC BLOOD PRESSURE: 74 MMHG

## 2022-11-10 DIAGNOSIS — Z78.0 MENOPAUSE: ICD-10-CM

## 2022-11-10 DIAGNOSIS — Z01.419 WELL WOMAN EXAM WITH ROUTINE GYNECOLOGICAL EXAM: Primary | ICD-10-CM

## 2022-11-10 DIAGNOSIS — N84.2 VAGINAL POLYP: ICD-10-CM

## 2022-11-10 PROCEDURE — 99396 PREV VISIT EST AGE 40-64: CPT | Performed by: OBSTETRICS & GYNECOLOGY

## 2022-11-10 PROCEDURE — G8482 FLU IMMUNIZE ORDER/ADMIN: HCPCS | Performed by: OBSTETRICS & GYNECOLOGY

## 2022-11-10 RX ORDER — PROGESTERONE 100 MG/1
100 CAPSULE ORAL NIGHTLY
Qty: 90 CAPSULE | Refills: 3 | Status: SHIPPED | OUTPATIENT
Start: 2022-11-10

## 2022-11-10 RX ORDER — ESTRADIOL 1 MG/1
1 TABLET ORAL NIGHTLY
Qty: 90 TABLET | Refills: 3 | Status: SHIPPED | OUTPATIENT
Start: 2022-11-10 | End: 2022-12-10

## 2022-11-10 ASSESSMENT — ENCOUNTER SYMPTOMS
ALLERGIC/IMMUNOLOGIC NEGATIVE: 1
EYES NEGATIVE: 1
GASTROINTESTINAL NEGATIVE: 1
RESPIRATORY NEGATIVE: 1

## 2022-11-10 NOTE — PROGRESS NOTES
Subjective:      Patient ID: Stevie Colunga is a 65 y.o. female. Patient is here for annual. Patient wants to continue HRT. When she had her bleeding, she had fallen flat on abdomen on cement. Follow-up US was normal. No bleeding since. Gynecologic Exam      Review of Systems   Constitutional: Negative. HENT: Negative. Eyes: Negative. Respiratory: Negative. Cardiovascular: Negative. Gastrointestinal: Negative. Endocrine: Negative. Genitourinary: Negative. Musculoskeletal: Negative. Skin: Negative. Allergic/Immunologic: Negative. Neurological: Negative. Hematological: Negative. Psychiatric/Behavioral: Negative.        Date of Birth 1960  Past Medical History:   Diagnosis Date    Abnormal Pap smear of cervix     Acquired hypothyroidism 2017    Anesthesia complication     \" doesn`t take much \"     COVID-19 virus infection 2022    DDD (degenerative disc disease), lumbar 2018    Diverticulitis 2020    DJD (degenerative joint disease), lumbar     Fatty liver 2020    Hormone disorder     Loose body joint-ankle/foot, right 2020    [facility]    Low grade squamous intraepith lesion on cytologic smear vagina (lgsil) 2014    Post-menopausal bleeding 2018    Prolonged emergence from general anesthesia     Sigmoid diverticulosis     Steroid-induced psychosis 2012     Past Surgical History:   Procedure Laterality Date    BREAST BIOPSY  2015    COLONOSCOPY      DILATION AND CURETTAGE OF UTERUS N/A 2019    HYSTEROSCOPY, DILATION AND CURETTAGE performed by Ibis Dasilva MD at [address] ADENOIDECTOMY       OB History    Para Term  AB Living   2 2 2     2   SAB IAB Ectopic Molar Multiple Live Births             2      # Outcome Date GA Lbr Fabrizio/2nd Weight Sex Delivery Anes PTL Lv   2 Term 0 37w0d   F Vag-Spont   CASSIDY   1 Term 1 37w0d   M Vag-Spont   CASSIDY     Social History     Socioeconomic History    Marital status:      Spouse name: Not on file    Number of children: Not on file    Years of education: Not on file    Highest education level: Not on file   Occupational History    Not on file   Tobacco Use    Smoking status: Former     Packs/day: 1.00     Years: 10.00     Pack years: 10.00     Types: Cigarettes     Quit date: 1996     Years since quittin.2    Smokeless tobacco: Never   Vaping Use    Vaping Use: Never used   Substance and Sexual Activity    Alcohol use: No     Alcohol/week: 0.0 standard drinks    Drug use: No     Comment: [employer] in college    Sexual activity: Yes     Partners: Male     Comment:    Other Topics Concern    Not on file   Social History Narrative    Not on file     Social Determinants of Health     Financial Resource Strain: Not on file   Food Insecurity: Not on file   Transportation Needs: Not on file   Physical Activity: Not on file   Stress: Not on file   Social Connections: Not on file   Intimate Partner Violence: Not on file   Housing Stability: Not on file     Allergies   Allergen Reactions    Lorazepam Other (See Comments)     Paradoxical reaction    Prednisone Other (See Comments)     Severe steroid psychosis \" can tolerate at loses for short periods\"     Outpatient Medications Marked as Taking for the 11/10/22 encounter (Office Visit) with Sri Rico MD   Medication Sig Dispense Refill    progesterone (PROMETRIUM) 100 MG CAPS capsule Take 1 capsule by mouth nightly 90 capsule 3    estradiol (ESTRACE) 1 MG tablet Take 1 tablet by mouth nightly 90 tablet 3    Cholecalciferol (VITAMIN D3) 50 MCG ( UT) CAPS Take 1 capsule by mouth 30 capsule     levothyroxine (SYNTHROID) 75 MCG tablet TAKE ONE TABLET BY MOUTH DAILY 90 tablet 1     Family History   Problem Relation Age of Onset    Cancer Mother 59        Leukemia    Cancer Father 50        Melenoma     /74 (Site: Right Upper Arm, Position: Sitting, Cuff Size: Large Adult)   Pulse 67   Resp 17   Ht 5' 5\" (1.651 m)   Wt 202 lb 9.6 oz (91.9 kg)   LMP 08/01/2016   SpO2 99%   BMI 33.71 kg/m²       Objective:   Physical Exam  Constitutional:       General: She is not in acute distress. Appearance: Normal appearance. She is well-developed and normal weight. She is not diaphoretic. HENT:      Head: Normocephalic and atraumatic. Nose: Nose normal.      Mouth/Throat:      Mouth: Mucous membranes are moist.      Pharynx: Oropharynx is clear. Eyes:      Extraocular Movements: Extraocular movements intact. Neck:      Thyroid: No thyromegaly. Cardiovascular:      Rate and Rhythm: Normal rate and regular rhythm. Heart sounds: Normal heart sounds. No murmur heard. No friction rub. No gallop. Pulmonary:      Effort: Pulmonary effort is normal. No respiratory distress. Breath sounds: Normal breath sounds. No wheezing or rales. Chest:   Breasts:     Right: Normal. No swelling, bleeding, inverted nipple, mass, nipple discharge, skin change or tenderness. Left: Normal. No swelling, bleeding, inverted nipple, mass, nipple discharge, skin change or tenderness. Abdominal:      General: Abdomen is flat. Bowel sounds are normal. There is no distension. Palpations: Abdomen is soft. There is no hepatomegaly or mass. Tenderness: There is no abdominal tenderness. There is no guarding or rebound. Hernia: No hernia is present. There is no hernia in the left inguinal area. Genitourinary:     General: Normal vulva. Exam position: Lithotomy position. Pubic Area: No rash. Labia:         Right: No rash, tenderness, lesion or injury. Left: No rash, tenderness, lesion or injury. Urethra: No prolapse, urethral pain, urethral swelling or urethral lesion. Vagina: Normal. No signs of injury and foreign body. No vaginal discharge, erythema, tenderness or bleeding.       Cervix: No cervical motion tenderness, discharge, friability, lesion, erythema, cervical bleeding or eversion. Uterus: Not deviated, not enlarged, not fixed, not tender and no uterine prolapse. Adnexa:         Right: No mass, tenderness or fullness. Left: No mass, tenderness or fullness. Rectum: Normal. Guaiac result negative. No mass, tenderness, anal fissure, external hemorrhoid or internal hemorrhoid. Normal anal tone. Comments: Normal urethral meatus, nl urethra, nl bladder. Left vaginal polyp    Musculoskeletal:         General: No swelling, tenderness, deformity or signs of injury. Normal range of motion. Cervical back: Normal range of motion and neck supple. No rigidity. Lymphadenopathy:      Cervical: No cervical adenopathy. Lower Body: No right inguinal adenopathy. No left inguinal adenopathy. Skin:     General: Skin is warm and dry. Coloration: Skin is not jaundiced or pale. Findings: No bruising, erythema, lesion or rash. Neurological:      General: No focal deficit present. Mental Status: She is alert and oriented to person, place, and time. Deep Tendon Reflexes: Reflexes are normal and symmetric. Psychiatric:         Mood and Affect: Mood normal.         Behavior: Behavior normal.         Thought Content: Thought content normal.         Judgment: Judgment normal.       Assessment:      Annual  Menopause  ? Left vaginal polyp      Plan:      Pap, calcium, exercise, mammogram, hemocult negative  Stable-refill estradiol/prometrium  Pap obtained on area also-feel could be start of early polyp or ? Area old scar that is more prominent due to menopause? Does not remember vaginal lac with birth but did have vag birth. Area looks pink and smooth. ?Wonder if vagina tore with fall?         Keerthi Rashid MD

## 2022-11-20 DIAGNOSIS — E03.9 ACQUIRED HYPOTHYROIDISM: ICD-10-CM

## 2022-11-21 RX ORDER — LEVOTHYROXINE SODIUM 0.07 MG/1
TABLET ORAL
Qty: 90 TABLET | Refills: 1 | Status: SHIPPED | OUTPATIENT
Start: 2022-11-21

## 2022-11-21 NOTE — TELEPHONE ENCOUNTER
Last appointment: 10/31/2022  Next appointment: Visit date not found  Last refill: 4/6/2022  #90 x1  Appointment not due for >6 months.

## 2023-02-06 ENCOUNTER — TELEPHONE (OUTPATIENT)
Dept: INTERNAL MEDICINE CLINIC | Age: 63
End: 2023-02-06

## 2023-02-06 NOTE — TELEPHONE ENCOUNTER
Patient sent message via [facility] to advise that she was going to ED    ----- Message -----       From:Brittni Clark       Sent:2/6/2023 10:12 AM EST         To:Patient Appointment Schedule Request Message List    Subject:Appointment Request    Dont worry about getting me in Im going to the hospital.

## 2023-02-06 NOTE — TELEPHONE ENCOUNTER
Patient is requesting an appt today with Dr. Maico Coe. Next available OV not until Wednesday and patient states she cannot wait that long. She states her diverticulitis is acting up and she has been nauseous and unable to eat for the past 3 days. Can patient be added on or should she go to ED? Please advise.

## 2023-03-15 NOTE — PROGRESS NOTES
[facility]) Physicians  Internal Medicine  Patient Encounter  Rand Flores D.O., [facility] Preventative Physical    Chief Complaint   Patient presents with    Annual Exam       HPI-- 65 y.o. female presents today requesting her annual preventative history and physical        Medical/Surgical Histories     Past Medical History:   Diagnosis Date    Abnormal Pap smear of cervix     Acquired hypothyroidism 03/20/2017    Anesthesia complication     \" doesn`t take much \"     COVID-19 virus infection 07/2022    DDD (degenerative disc disease), lumbar 02/2018    Diverticulitis 02/25/2020    DJD (degenerative joint disease), lumbar     Fatty liver 02/25/2020    Hormone disorder     Loose body joint-ankle/foot, right 09/2020    [facility]    Low grade squamous intraepith lesion on cytologic smear vagina (lgsil) 11/2014    Post-menopausal bleeding 03/21/2018    Prolonged emergence from general anesthesia     Sigmoid diverticulosis     Steroid-induced psychosis 07/2012          Past Surgical History:   Procedure Laterality Date    BREAST BIOPSY  07/29/2015    COLONOSCOPY      DILATION AND CURETTAGE OF UTERUS N/A 4/26/2019    HYSTEROSCOPY, DILATION AND CURETTAGE performed by Alison Roberson MD at [address] [place] [address]  2011           Medications/Allergies     Current Outpatient Medications   Medication Sig Dispense Refill    Cholecalciferol (VITAMIN D3) 50 MCG (2000 UT) CAPS Take 2 capsules by mouth      levothyroxine (SYNTHROID) 75 MCG tablet TAKE ONE TABLET BY MOUTH DAILY 90 tablet 1    progesterone (PROMETRIUM) 100 MG CAPS capsule Take 1 capsule by mouth nightly 90 capsule 3    estradiol (ESTRACE) 1 MG tablet Take 1 tablet by mouth nightly 90 tablet 3     No current facility-administered medications for this visit.         Allergies   Allergen Reactions    Lorazepam Other (See Comments)     Paradoxical reaction    Prednisone Other (See Comments)     Severe steroid psychosis \" can tolerate at loses for short periods\"         Substance Use History     Social History     Tobacco Use    Smoking status: Former     Packs/day: 1.00     Years: 10.00     Pack years: 10.00     Types: Cigarettes     Quit date: 1996     Years since quittin.1    Smokeless tobacco: Never   Vaping Use    Vaping Use: Never used   Substance Use Topics    Alcohol use: No     Alcohol/week: 0.0 standard drinks    Drug use: No     Comment: [employer] in college          Family History     Family History   Problem Relation Age of Onset    Cancer Mother 59        Leukemia    Cancer Father 48        Melenoma              REVIEW OF SYSTEMS:    CONSTITUTIONAL:  Neg   Recent weight changes, fatigue, fever, chills or night sweats, anorexia, Sleep difficulties  EYES: Neg  Blurry vision, loss of vision, double vision, tearing, itching, eye pain. EARS:  Neg Hearing loss,  vertigo, discharge or otalgia. + Tinnitus. NOSE:  Neg  Rhinorrhea, sneezing, itching, allergy, epistaxis, or snoring  MOUTH/THROAT:  Neg Bleeding gums, hoarseness, sore throat, dysphagia, throat infections, or dentures  RESPIRATORY:  Neg SOB ,wheeze, cough, sputum, hemoptysis. No report of + TB test.    CARDIOVASCULAR:  Neg Chest pain, palpitations, heart murmur, dyspnea on exertion, orthopnea, paroxysmal nocturnal dyspnea or edema of extremities, or claudication. Cardiac evaluation 2017-- Went to [facility], EKG was NL, cardiac enzymes NL. GASTROINTESTINAL:  Neg   Nausea, vomiting, or diarrhea, constipation, hematemesis, heart burn, dysphagia,change in bowel movements or stool caliber, hematochezia, melena, abdominal pain, or food intolerance. Colonoscopy: Yes-- 2016, 10 year recall. GENITOURINARY:  Neg  Urinary frequency, hesitancy, urgency, polyuria, dysuria, hematuria, nocturia, incontinence, change in stream, genital pain or swelling, kidney stones, STD's. PAP/PATTI: Yes. Had abnormal PAP, mild dysplasia.  + recent UTI.   Had D&C in 2019. Overdue for follow-up. HEMATOLOGIC/LYMPHATIC:  Neg  Anemia, bleeding dyscrasias, easy bruising, blood clots (DVT/PE), transfusions, or enlarged lymph nodes  MUSCULOSKELETAL:  Neg  New myalgias, bone pain, joint pain, joint swelling, neck pain, radicular pain, or fractures. Low back pain. She has seen orthopedics--Dr. Cara Goodwin at [facility]. She states she fell in July 2022-- fell forward and struck chin. Since the fall she has had neck pain on the left. She may have radiating pain. She notes cracking in the neck. X-ray of the C-Spine 1/2/2020----> Cer DDD, Spondylosis. She has been seen by [facility] in the past as well. No exercising due to low back. NEUROLOGICAL:  Neg  Loss of Consciousness, memeory loss or forgetfulness, confusion, difficulty concentrating, seizures, insomina, aphasia or dysarthria unilateral weakness or, ataxia, headaches, syncope, tremor, or H/O head trauma. No significant paresthesias. PSYCHIATRIC:  Neg  Depression, anxiety, personality changes, nervousness, drug or alcohol use/abuse, H/O psych counseling: No, Psychotropics: No  SKIN :  Neg  Rash, nail changes, sun burns, tattoos, change in moles, or skin color changes. She sees Dermatologist-- [facility], Dr. Shayla Rivera. She has not been    ENDOCRINE:  Neg  Polydipsia,polyuria,abnormal weight changes,heat /cold intolerance, Hair changes. No H/O Diabetes.  + Hypothyroidism.        Preventive Care:    Health Maintenance   Topic Date Due    Diabetes screen  Never done    COVID-19 Vaccine (2 - Booster for Christa series) 10/31/2027 (Originally 6/7/2021)    Depression Screen  10/31/2023    Breast cancer screen  04/05/2024    DTaP/Tdap/Td vaccine (2 - Td or Tdap) 05/21/2024    Cervical cancer screen  11/09/2024    Lipids  07/19/2026    Colorectal Cancer Screen  09/13/2026    Flu vaccine  Completed    Shingles vaccine  Completed    Hepatitis C screen  Completed    HIV screen  Completed    Hepatitis A vaccine Aged Out    Hib vaccine  Aged Out    Meningococcal (ACWY) vaccine  Aged Out    Pneumococcal 0-64 years Vaccine  Aged Out      Hx abnormal PAP: yes, Dr. Alcantara   Sexual activity: single partner, , post-menopausal  Self-breast exams: yes  Last eye exam: 2019, normal, readers  Exercise:  No regular exercise  Diet: Healthy diet-- Fruits, vegetables, lean meats. Avoid sweets. Portions are good. Seatbelt use: Yes  Sun Screen: Yes  Dentist: Every 4-6 months.  UTD. Physical Exam    Vitals:    10/31/22 1009   BP: 118/84   Pulse: 89   Resp: 12   SpO2: 97%   Weight: 206 lb (93.4 kg)   Height: 5' 5\" (1.651 m)     Body mass index is 34.28 kg/m². Wt Readings from Last 3 Encounters:   10/31/22 206 lb (93.4 kg)   04/18/22 204 lb 6.4 oz (92.7 kg)   02/17/22 201 lb (91.2 kg)     BP Readings from Last 3 Encounters:   10/31/22 118/84   04/18/22 132/86   02/17/22 128/84        GEN:  65 y.o. female who is in NAD, A&O. She appears stated age and well nourished. She is cooperative and pleasant. Overweight. HEAD:  NC/AT, no lesions. EYES:  YURI, EOMI, No scleral icterus or conjunctival injection or discharge. Visual fields in tact to confrontation. EARS:  EAC's clear, TM's normal.  NOSE:  Nasal cavity is clear. No mucosal congestion or discharge. Sinuses are nontender. MOUTH & THROAT:  Oral cavity is clear without mucosal lesions. Tongue is midline. Dentition is in good repair. No pharyngeal erythema or exudate. NECK:  Supple. Full ROM. Trachea is midline. No increased JVD. No thyromegaly or nodules. No masses  LYMPH: No C/SC/A/F nodes  CARDIAC:  S1S2 NL. Regular rhythm. No murmur/clicks/rubs. No ectopy. PMI is non-displaced. VASC:  Pedal pulses 2/4. Carotid upstrokes 2+. No bruits noted. PULM:  Lungs are CTA. Symmetric breath sounds noted. AP Diameter NL. GI:  Abdomen is soft and nontender. No distension. No organomegaly. No masses. No pulsatile masses.     EXT:  No Cyanosis or clubbing. No edema. SKIN: Warm and dry, normal turgor, no lesions of concern. NEURO:  Cranial nerves 2-12 are NL. Speech fluent and coherent. Strength is 5/5 in all muscle groups. No sensory deficits. No focal or lateralizing deficits. Reflexes 2/4 and symmetric. Gait is normal.  MS:  Tenderness with palpation of the cervical paraspinals. Increased soft tissue tone and spasm noted. ROM is limited in all plains. No joint effusions. PSYCH:  Mood and affect NL. Judgement and insight NL.      ASSESSMENT/PLAN:    1. Annual physical exam  All care gaps identified and addressed  Patient refuses COVID-19 vaccine updated booster  Patient did receive her flu vaccine today  Patient counseled on lifestyle modifications and weight loss strategies  - EKG 12 Lead  - CBC with Auto Differential; Future  - Comprehensive Metabolic Panel; Future  - Lipid Panel; Future  - TSH; Future  - Vitamin D 25 Hydroxy; Future  - Hemoglobin A1C; Future    2. Screening for diabetes mellitus    - Comprehensive Metabolic Panel; Future  - Hemoglobin A1C; Future    3. Mixed hyperlipidemia    - Comprehensive Metabolic Panel; Future  - Lipid Panel; Future  - TSH; Future    4. Fatty liver  Seen on CT scan  Lifestyle modification strategies discussed. Recommended carb controlled and low-fat food options along with calorie restriction and regular exercise  - Comprehensive Metabolic Panel; Future    5. Class 1 obesity due to excess calories without serious comorbidity with body mass index (BMI) of 34.0 to 34.9 in adult  Condition is uncontrolled. Her weight is up a little bit this year  Lifestyle approach  - CBC with Auto Differential; Future  - Comprehensive Metabolic Panel; Future  - Lipid Panel; Future  - TSH; Future    6. Acquired hypothyroidism    - TSH; Future    7. Impaired fasting glucose    - Comprehensive Metabolic Panel;  Future  - Hemoglobin A1C; Future                 Preventive plan of care for [place] yes 10/31/2022           Preventive Measures Status       Recommendations for screening   Colon Cancer Screen   Last colonoscopy: 9/13/16 Test recommended every 10 years. Next colonoscopy due in 2026   Breast Cancer Screen  Last mammogram: 4/4/2022 Mammogram due every 2 years. Next mammogram due November 2021   Cervical Cancer Screen   Last PAP smear: 11/9/2021 Test is due every 3-5 years or as advised by your gynecologist.   Osteoporosis Screen   Last DXA scan: 6/6/2014, Normal Repeat test at age 73   Diabetes Screen  Glucose (mg/dL)   Date Value   07/19/2021 102 (H)    Test recommended and ordered   Cholesterol Screen  Lab Results   Component Value Date    CHOL 252 (H) 07/19/2021    Test recommended and ordered   HIV screening recommended for those ages 12-76 NO MATTER THE RISK FOR HIV--  9/1/2016 No need to repeat at this time   Hepatitis C screening recommended for those born between [facility]-- 9/1/2016 No need to repeat at this time   Aspirin for Cardiovascular Prevention   none Aspirin is not currently indicated due to your low 10-year atherosclerotic cardiovascular disease risk score    Recommended Immunizations    Immunization History   Administered Date(s) Administered    COVID-19, J&J, (age 18y+), IM, 0.5 mL 04/12/2021    Influenza Vaccine, unspecified formulation 10/10/2017, 11/01/2018    Influenza Virus Vaccine 09/30/2009, 10/10/2017, 11/01/2018, 09/09/2019    Influenza, FLUARIX, FLULAVAL, FLUZONE (age 10 mo+) AND AFLURIA, (age 1 y+), PF, 0.5mL 01/15/2021    Influenza, FLUCELVAX, (age 10 mo+), MDCK, PF, 0.5mL 10/31/2022    Tdap (Boostrix, Adacel) 05/21/2014    Zoster Recombinant (Shingrix) 07/19/2021, 09/27/2021        Hep A- vaccination-- + Antibody titers 5/25/2014    Influenza vaccine:  recommended every fall    Pneumonia vaccine: Prevnar due at age 72    Tetanus vaccine:  tetanus and diptheria vaccine (Td/Tdap) recommended every 10 years- Td due in 2024.       Shingles vaccine: Shingrx is complete    COVID-19 vaccine updated booster-recommended     Additional Recommendations   1. Use sunscreen daily to help reduce the risk of skin cancer. Aloe up with your dermatologist yearly  2. Continue Lifestyle modification including low calorie diet focusing on Low fat/low cholesterol and low carbohydrate intake, along with  increasing cardiovascular (aerobic) exercise. 3. Update your eye exam every 2 years  4. Always wear a seatbelt while in a car  5. Always wear a helmet when riding a bike or motorcycle  6. Calcium 1000 mg daily (Diet and or supplement)  7. Vit D3 2000 IU's daily. Here are a few  Reliable websites with a variety of health and wellness information:   www.mylifecheck. heart. org     www.nutritionsource. org     www. americanheart. org     www. diabetes. org      www.menopause. org     www.[facility]     www.[facility])    The 10-year ASCVD risk score (Margaret SNYDER, et al., 2019) is: 4%    Values used to calculate the score:      Age: 65 years      Sex: Female      Is Non- : No      Diabetic: No      Tobacco smoker: No      Systolic Blood Pressure: 575 mmHg      Is BP treated: No      HDL Cholesterol: 48 mg/dL      Total Cholesterol: 252 mg/dL

## 2023-05-15 RX ORDER — PROGESTERONE 100 MG/1
CAPSULE ORAL
Qty: 90 CAPSULE | Refills: 3 | Status: SHIPPED | OUTPATIENT
Start: 2023-05-15

## 2023-05-18 RX ORDER — ESTRADIOL 1 MG/1
TABLET ORAL
Qty: 90 TABLET | Refills: 3 | Status: SHIPPED | OUTPATIENT
Start: 2023-05-18

## 2023-05-25 DIAGNOSIS — E03.9 ACQUIRED HYPOTHYROIDISM: ICD-10-CM

## 2023-05-25 RX ORDER — LEVOTHYROXINE SODIUM 0.07 MG/1
TABLET ORAL
Qty: 90 TABLET | Refills: 0 | Status: SHIPPED | OUTPATIENT
Start: 2023-05-25

## 2023-05-25 NOTE — TELEPHONE ENCOUNTER
Last appointment: 10/31/2022  Next appointment: Visit date not found  Last refill: 11/21/22  Sent Sticky message to schedule due/overdue appointment.

## 2023-05-26 ENCOUNTER — OFFICE VISIT (OUTPATIENT)
Dept: INTERNAL MEDICINE CLINIC | Age: 63
End: 2023-05-26
Payer: COMMERCIAL

## 2023-05-26 VITALS
RESPIRATION RATE: 14 BRPM | HEIGHT: 65 IN | BODY MASS INDEX: 33.66 KG/M2 | WEIGHT: 202 LBS | OXYGEN SATURATION: 99 % | SYSTOLIC BLOOD PRESSURE: 128 MMHG | DIASTOLIC BLOOD PRESSURE: 86 MMHG | HEART RATE: 86 BPM

## 2023-05-26 DIAGNOSIS — K76.0 FATTY LIVER: ICD-10-CM

## 2023-05-26 DIAGNOSIS — E78.2 MIXED HYPERLIPIDEMIA: ICD-10-CM

## 2023-05-26 DIAGNOSIS — R73.01 IMPAIRED FASTING GLUCOSE: ICD-10-CM

## 2023-05-26 DIAGNOSIS — E03.9 ACQUIRED HYPOTHYROIDISM: Primary | ICD-10-CM

## 2023-05-26 PROCEDURE — G8427 DOCREV CUR MEDS BY ELIG CLIN: HCPCS | Performed by: INTERNAL MEDICINE

## 2023-05-26 PROCEDURE — 1036F TOBACCO NON-USER: CPT | Performed by: INTERNAL MEDICINE

## 2023-05-26 PROCEDURE — 3017F COLORECTAL CA SCREEN DOC REV: CPT | Performed by: INTERNAL MEDICINE

## 2023-05-26 PROCEDURE — G8417 CALC BMI ABV UP PARAM F/U: HCPCS | Performed by: INTERNAL MEDICINE

## 2023-05-26 PROCEDURE — 99214 OFFICE O/P EST MOD 30 MIN: CPT | Performed by: INTERNAL MEDICINE

## 2023-05-26 SDOH — ECONOMIC STABILITY: INCOME INSECURITY: HOW HARD IS IT FOR YOU TO PAY FOR THE VERY BASICS LIKE FOOD, HOUSING, MEDICAL CARE, AND HEATING?: NOT HARD AT ALL

## 2023-05-26 SDOH — ECONOMIC STABILITY: FOOD INSECURITY: WITHIN THE PAST 12 MONTHS, THE FOOD YOU BOUGHT JUST DIDN'T LAST AND YOU DIDN'T HAVE MONEY TO GET MORE.: NEVER TRUE

## 2023-05-26 SDOH — ECONOMIC STABILITY: FOOD INSECURITY: WITHIN THE PAST 12 MONTHS, YOU WORRIED THAT YOUR FOOD WOULD RUN OUT BEFORE YOU GOT MONEY TO BUY MORE.: NEVER TRUE

## 2023-05-26 SDOH — ECONOMIC STABILITY: HOUSING INSECURITY
IN THE LAST 12 MONTHS, WAS THERE A TIME WHEN YOU DID NOT HAVE A STEADY PLACE TO SLEEP OR SLEPT IN A SHELTER (INCLUDING NOW)?: NO

## 2023-05-26 ASSESSMENT — PATIENT HEALTH QUESTIONNAIRE - PHQ9
1. LITTLE INTEREST OR PLEASURE IN DOING THINGS: 0
SUM OF ALL RESPONSES TO PHQ QUESTIONS 1-9: 0
SUM OF ALL RESPONSES TO PHQ9 QUESTIONS 1 & 2: 0
2. FEELING DOWN, DEPRESSED OR HOPELESS: 0
SUM OF ALL RESPONSES TO PHQ QUESTIONS 1-9: 0

## 2023-05-26 NOTE — PROGRESS NOTES
Sigmoid diverticulosis     Steroid-induced psychosis 07/2012         MEDICATIONS:  Prior to Visit Medications    Medication Sig   levothyroxine (SYNTHROID) 75 MCG tablet TAKE ONE TABLET BY MOUTH DAILY   estradiol (ESTRACE) 1 MG tablet TAKE ONE TABLET BY MOUTH ONCE NIGHTLY   progesterone (PROMETRIUM) 100 MG CAPS capsule TAKE ONE CAPSULE BY MOUTH ONCE NIGHTLY          Review of Systems - As per HPI      OBJECTIVE:  Vitals:    05/26/23 0923   BP: 128/86   Pulse: 86   Resp: 14   SpO2: 99%   Weight: 202 lb (91.6 kg)   Height: 5' 5\" (1.651 m)     Body mass index is 33.61 kg/m². Wt Readings from Last 3 Encounters:   05/26/23 202 lb (91.6 kg)   11/10/22 202 lb 9.6 oz (91.9 kg)   10/31/22 206 lb (93.4 kg)     BP Readings from Last 3 Encounters:   05/26/23 128/86   11/10/22 114/74   10/31/22 118/84      GEN: NAD, A&O, Non-toxic  HEENT: NC/AT, RAMOS, EOMI, Oral cavity Clear,  TM's NL, Nasal cavity clear. NECK: Supple. No thyromegaly. No JVD  LYMPH: No C/SC nodes. CV: S1 S2 NL, RRR. No murmurs, clicks or rubs. PULM: CTA, symmentric air exchange  EXT: No C/C/E  GI: Abdomen is soft, NT, BS+, No hepatomegaly. No masses. NEURO: No focal or lateralizing deficits. VASC:  No carotid bruits. Pulses symmetric  SKIN:  No rashes or lesions of concern      ASSESSMENT/PLAN:    1. Acquired hypothyroidism  Asymptomatic and well controlled  Recheck lab at her physical in 6 months  Continue current dose of levothyroxine    2. Mixed hyperlipidemia  Uncontrolled but improved since July 2021  [address] lab at her physical  Continue lifestyle approach    3. Fatty liver  Asymptomatic  Continue to monitor LFTs. Will check lab at her physical    4. Impaired fasting glucose  Asymptomatic  Check lab at her physical  Continue a carb controlled diet and regular exercise. Patient counseled. Discussed medications with patient who voiced understanding of their use, indication and potential side effects.   Pt also understands the

## 2023-11-14 ENCOUNTER — OFFICE VISIT (OUTPATIENT)
Dept: GYNECOLOGY | Age: 63
End: 2023-11-14
Payer: COMMERCIAL

## 2023-11-14 ENCOUNTER — OFFICE VISIT (OUTPATIENT)
Dept: INTERNAL MEDICINE CLINIC | Age: 63
End: 2023-11-14
Payer: COMMERCIAL

## 2023-11-14 VITALS
HEIGHT: 65 IN | HEART RATE: 80 BPM | SYSTOLIC BLOOD PRESSURE: 124 MMHG | WEIGHT: 196 LBS | BODY MASS INDEX: 32.65 KG/M2 | RESPIRATION RATE: 17 BRPM | DIASTOLIC BLOOD PRESSURE: 82 MMHG

## 2023-11-14 VITALS
OXYGEN SATURATION: 97 % | HEIGHT: 65 IN | DIASTOLIC BLOOD PRESSURE: 82 MMHG | SYSTOLIC BLOOD PRESSURE: 124 MMHG | HEART RATE: 93 BPM | BODY MASS INDEX: 32.65 KG/M2 | RESPIRATION RATE: 12 BRPM | WEIGHT: 196 LBS

## 2023-11-14 DIAGNOSIS — Z00.00 ANNUAL PHYSICAL EXAM: Primary | ICD-10-CM

## 2023-11-14 DIAGNOSIS — Z13.1 SCREENING FOR DIABETES MELLITUS: ICD-10-CM

## 2023-11-14 DIAGNOSIS — Z01.419 WELL WOMAN EXAM WITH ROUTINE GYNECOLOGICAL EXAM: Primary | ICD-10-CM

## 2023-11-14 DIAGNOSIS — E03.9 ACQUIRED HYPOTHYROIDISM: ICD-10-CM

## 2023-11-14 DIAGNOSIS — K76.0 FATTY LIVER: ICD-10-CM

## 2023-11-14 DIAGNOSIS — E78.2 MIXED HYPERLIPIDEMIA: ICD-10-CM

## 2023-11-14 DIAGNOSIS — E66.09 CLASS 1 OBESITY DUE TO EXCESS CALORIES WITH SERIOUS COMORBIDITY AND BODY MASS INDEX (BMI) OF 32.0 TO 32.9 IN ADULT: ICD-10-CM

## 2023-11-14 DIAGNOSIS — R73.01 IMPAIRED FASTING GLUCOSE: ICD-10-CM

## 2023-11-14 PROCEDURE — 99396 PREV VISIT EST AGE 40-64: CPT | Performed by: OBSTETRICS & GYNECOLOGY

## 2023-11-14 PROCEDURE — 99396 PREV VISIT EST AGE 40-64: CPT | Performed by: INTERNAL MEDICINE

## 2023-11-14 PROCEDURE — G8484 FLU IMMUNIZE NO ADMIN: HCPCS | Performed by: INTERNAL MEDICINE

## 2023-11-14 PROCEDURE — G8484 FLU IMMUNIZE NO ADMIN: HCPCS | Performed by: OBSTETRICS & GYNECOLOGY

## 2023-11-14 NOTE — PATIENT INSTRUCTIONS
Preventive plan of care for Ashok Morgan        11/14/2023           Preventive Measures Status       Recommendations for screening   Colon Cancer Screen   Last colonoscopy: 9/13/16 Test recommended every 10 years.   Next colonoscopy due in 2026   Breast Cancer Screen  Last mammogram: 5/27/2023 Recommend mammogram yearly   Cervical Cancer Screen   Last PAP smear: 11/10/2022 Test is due at interval advised by your gynecologist.  Kristopher Rodney should have an annual pelvic and breast examination   Osteoporosis Screen   Last DXA scan: 6/6/2014, Normal Repeat test at age 73   Diabetes Screen  Glucose (mg/dL)   Date Value   11/11/2022 99    Test recommended and ordered   Cholesterol Screen  Lab Results   Component Value Date    CHOL 221 (H) 11/11/2022    TRIG 185 (H) 11/11/2022    HDL 46 11/11/2022    LDLCALC 138 (H) 11/11/2022    Test recommended and ordered   HIV screening recommended for those ages 14-79 NO MATTER THE RISK FOR HIV--  9/1/2016 No need to repeat at this time   Hepatitis C screening recommended for those born between [address] [place] [address]-- 9/1/2016 No need to repeat at this time   Aspirin for Cardiovascular Prevention   none Aspirin is not currently indicated due to your low 10-year atherosclerotic cardiovascular disease risk score    Recommended Immunizations    Immunization History   Administered Date(s) Administered    COVID-19, J&J, (age 18y+), IM, 0.5 mL 04/12/2021    Influenza Vaccine, unspecified formulation 10/10/2017, 11/01/2018    Influenza Virus Vaccine 09/30/2009, 10/10/2017, 11/01/2018, 09/09/2019    Influenza, FLUARIX, FLULAVAL, FLUZONE (age 10 mo+) AND AFLURIA, (age 1 y+), PF, 0.5mL 01/15/2021    Influenza, FLUCELVAX, (age 10 mo+), MDCK, PF, 0.5mL 10/31/2022    TDaP, ADACEL (age 6y-58y), [facility] (age 10y+), IM, 0.5mL 05/21/2014    Zoster Recombinant (Shingrix) 07/19/2021, 09/27/2021        Hep A- vaccination-- + Antibody titers 5/25/2014    Influenza vaccine:  recommended every fall    Pneumonia vaccine:

## 2023-11-18 ASSESSMENT — ENCOUNTER SYMPTOMS
EYES NEGATIVE: 1
ALLERGIC/IMMUNOLOGIC NEGATIVE: 1
RESPIRATORY NEGATIVE: 1
GASTROINTESTINAL NEGATIVE: 1

## (undated) DEVICE — ELECTRODE ES L65IN DIA3MM S STL BLDE MPLR OPN APPRCH EZ TO

## (undated) DEVICE — Z DISCONTINUED BY MEDLINE USE 2711682 TRAY SKIN PREP DRY W/ PREM GLV

## (undated) DEVICE — GLOVE SURG SZ 65 L12IN FNGR THK87MIL WHT LTX FREE

## (undated) DEVICE — GOWN SIRUS NONREIN XL W/TWL: Brand: MEDLINE INDUSTRIES, INC.

## (undated) DEVICE — DRAPE,UNDERBUTTOCKS,PCH,STERILE: Brand: MEDLINE

## (undated) DEVICE — Z DISCONTINUED USE 2270995 CATHETER URETH 16FR L16IN RED RUB INTMIT RND HLLW TIP 2 OPP

## (undated) DEVICE — KIT OR ROOM TURNOVER W/STRAP

## (undated) DEVICE — SOLUTION SCRB 4OZ 10% POVIDONE IOD ANTIMIC BTL

## (undated) DEVICE — Y-TYPE TUR/BLADDER IRRIGATION SET, REGULATING CLAMP

## (undated) DEVICE — TUBING, SUCTION, 1/4" X 12', STRAIGHT: Brand: MEDLINE

## (undated) DEVICE — Z INACTIVE USE 2660664 SOLUTION IRRIG 3000ML 0.9% SOD CHL USP UROMATIC PLAS CONT

## (undated) DEVICE — SOLUTION IV 1000ML 0.9% SOD CHL FOR IRRIG PLAS CONT

## (undated) DEVICE — APPLICATOR ST RAYON TIP

## (undated) DEVICE — MINOR SET UP PK

## (undated) DEVICE — ELECTROSURGICAL PENCIL BUTTON SWITCH NON COATED BLADE ELECTRODE 10 FT (3 M) CORD HOLSTER: Brand: MEGADYNE

## (undated) DEVICE — DEVICE MANIP L330MM DIA4.5MM UTER DISP INJ ZINNANTI KRONNER

## (undated) DEVICE — ELECTRODE PT RET AD L9FT HI MOIST COND ADH HYDRGEL CORDED

## (undated) DEVICE — CANISTER, RIGID, 1200CC: Brand: MEDLINE INDUSTRIES, INC.

## (undated) DEVICE — COVER LT HNDL BLU PLAS

## (undated) DEVICE — PAD SANITARY MTRN TAB BELT WRP NS 11IN

## (undated) DEVICE — PAD,NON-ADHERENT,3X8,STERILE,LF,1/PK: Brand: MEDLINE

## (undated) DEVICE — SOLUTION PREP POVIDONE IOD FOR SKIN MUCOUS MEM PRIOR TO

## (undated) DEVICE — DRAPE LAP 104X35X124IN BLU CTRL + FAB REINF ABD FEN

## (undated) DEVICE — INVIEW CLEAR LEGGINGS: Brand: CONVERTORS

## (undated) DEVICE — UNDERPAD INCONT XL MESH PROTCT + DISP FOR MAT USE